# Patient Record
Sex: FEMALE | Race: WHITE | NOT HISPANIC OR LATINO | Employment: FULL TIME | URBAN - METROPOLITAN AREA
[De-identification: names, ages, dates, MRNs, and addresses within clinical notes are randomized per-mention and may not be internally consistent; named-entity substitution may affect disease eponyms.]

---

## 2017-03-21 ENCOUNTER — ALLSCRIPTS OFFICE VISIT (OUTPATIENT)
Dept: OTHER | Facility: OTHER | Age: 57
End: 2017-03-21

## 2017-03-24 ENCOUNTER — ALLSCRIPTS OFFICE VISIT (OUTPATIENT)
Dept: RADIOLOGY | Facility: CLINIC | Age: 57
End: 2017-03-24
Payer: COMMERCIAL

## 2017-03-24 PROCEDURE — 77003 FLUOROGUIDE FOR SPINE INJECT: CPT | Performed by: ANESTHESIOLOGY

## 2017-04-10 ENCOUNTER — ALLSCRIPTS OFFICE VISIT (OUTPATIENT)
Dept: RADIOLOGY | Facility: CLINIC | Age: 57
End: 2017-04-10
Payer: COMMERCIAL

## 2017-04-10 PROCEDURE — 77003 FLUOROGUIDE FOR SPINE INJECT: CPT | Performed by: ANESTHESIOLOGY

## 2017-04-26 ENCOUNTER — GENERIC CONVERSION - ENCOUNTER (OUTPATIENT)
Dept: OTHER | Facility: OTHER | Age: 57
End: 2017-04-26

## 2017-06-30 ENCOUNTER — ALLSCRIPTS OFFICE VISIT (OUTPATIENT)
Dept: OTHER | Facility: OTHER | Age: 57
End: 2017-06-30

## 2017-06-30 ENCOUNTER — APPOINTMENT (OUTPATIENT)
Dept: RADIOLOGY | Facility: CLINIC | Age: 57
End: 2017-06-30
Payer: COMMERCIAL

## 2017-06-30 DIAGNOSIS — M43.10 SPONDYLOLISTHESIS: ICD-10-CM

## 2017-06-30 PROCEDURE — 72020 X-RAY EXAM OF SPINE 1 VIEW: CPT

## 2017-07-05 ENCOUNTER — GENERIC CONVERSION - ENCOUNTER (OUTPATIENT)
Dept: OTHER | Facility: OTHER | Age: 57
End: 2017-07-05

## 2017-07-07 ENCOUNTER — ALLSCRIPTS OFFICE VISIT (OUTPATIENT)
Dept: RADIOLOGY | Facility: CLINIC | Age: 57
End: 2017-07-07
Payer: COMMERCIAL

## 2017-07-14 ENCOUNTER — GENERIC CONVERSION - ENCOUNTER (OUTPATIENT)
Dept: OTHER | Facility: OTHER | Age: 57
End: 2017-07-14

## 2017-07-24 ENCOUNTER — ALLSCRIPTS OFFICE VISIT (OUTPATIENT)
Dept: RADIOLOGY | Facility: CLINIC | Age: 57
End: 2017-07-24
Payer: COMMERCIAL

## 2018-01-13 VITALS
BODY MASS INDEX: 43.4 KG/M2 | TEMPERATURE: 98.2 F | DIASTOLIC BLOOD PRESSURE: 64 MMHG | HEIGHT: 69 IN | WEIGHT: 293 LBS | SYSTOLIC BLOOD PRESSURE: 122 MMHG | HEART RATE: 80 BPM

## 2018-01-15 VITALS
BODY MASS INDEX: 43.4 KG/M2 | HEIGHT: 69 IN | DIASTOLIC BLOOD PRESSURE: 80 MMHG | SYSTOLIC BLOOD PRESSURE: 140 MMHG | HEART RATE: 66 BPM | WEIGHT: 293 LBS

## 2018-01-15 NOTE — RESULT NOTES
Message   Recorded as Task   Date: 07/03/2017 12:08 PM, Created By: [id number]   Task Name: Miscellaneous   Assigned To: Cristina Coyle   Regarding Patient: Mirna Ly, Status: Active   Comment:    Cristina Coyle - 03 Jul 2017 12:08 PM     TASK CREATED  l/m for pt to rtc to [facility]   (LESI X2)   Cristina Coyle - 05 Jul 2017 11:54 AM     TASK EDITED  pt rtc and procedure scheduled    1st on 7/10/2017, the #2 on 07/24/2017  pt aware need for , npo 1 hr prior, hold Naproxen 4 days pt states she does not have anymore left, if becomes ill/antbx call to r/s, wear comfortable pants, pt verbally understanding

## 2018-01-16 NOTE — RESULT NOTES
Message   Recorded as Task   Date: 04/17/2017 04:03 PM, Created By: Monica Hale   Task Name: Follow Up   Assigned To: [facility] [place] procedure,Team   Regarding Patient: Shawn Simeon, Status: Active   Comment:    Monica Hale - 17 Apr 2017 4:03 PM     TASK CREATED  S/p [place] #2 on 4/10/17 w/[place]  No f/u scheduled    Please call 4/17/17   Monica Hale - 19 Apr 2017 2:53 PM     TASK EDITED  1st attempt-lm for f/u after injection   Monica Hale - 21 Apr 2017 9:53 AM     TASK EDITED  2nd attempt-lm for f/u after injection   Oscar Ramírez - 21 Apr 2017 1:25 PM     TASK EDITED  Please send [address] letter  Lucrecia Messina - 24 Apr 2017 1:47 PM     TASK EDITED  Pt returned call following procedure  Pls call pt on cell # [phone]  Kendall,Rochelle - 25 Apr 2017 10:12 AM     TASK REASSIGNED: Previously Assigned To Kathryn De Anda Thomas - 25 Apr 2017 2:23 PM     TASK EDITED  Pt reports 90% relief post inj    F/U is PRN     Sukhi Avitia - 25 Apr 2017 6:07 PM     TASK REPLIED TO: Previously Assigned To Sukhi Avitia  thanks        Signatures   Electronically signed by : Dilan Rosa, ; Apr 26 2017  9:02AM EST                       (Author)

## 2019-10-01 ENCOUNTER — OFFICE VISIT (OUTPATIENT)
Dept: PAIN MEDICINE | Facility: CLINIC | Age: 59
End: 2019-10-01
Payer: COMMERCIAL

## 2019-10-01 VITALS
HEART RATE: 62 BPM | WEIGHT: 293 LBS | DIASTOLIC BLOOD PRESSURE: 82 MMHG | BODY MASS INDEX: 43.4 KG/M2 | HEIGHT: 69 IN | SYSTOLIC BLOOD PRESSURE: 140 MMHG

## 2019-10-01 DIAGNOSIS — M54.16 LUMBAR RADICULOPATHY: ICD-10-CM

## 2019-10-01 DIAGNOSIS — M48.062 SPINAL STENOSIS OF LUMBAR REGION WITH NEUROGENIC CLAUDICATION: Primary | ICD-10-CM

## 2019-10-01 PROBLEM — M54.2 NECK PAIN: Status: ACTIVE | Noted: 2017-03-21

## 2019-10-01 PROBLEM — M51.17 INTERVERTEBRAL DISC DISORDER WITH RADICULOPATHY OF LUMBOSACRAL REGION: Status: ACTIVE | Noted: 2017-07-05

## 2019-10-01 PROBLEM — M43.10 SPONDYLOLISTHESIS, ACQUIRED: Status: ACTIVE | Noted: 2017-06-30

## 2019-10-01 PROBLEM — M54.50 LOWER BACK PAIN: Status: ACTIVE | Noted: 2017-06-30

## 2019-10-01 PROBLEM — I10 HYPERTENSION: Status: ACTIVE | Noted: 2017-03-21

## 2019-10-01 PROBLEM — J45.909 ASTHMA: Status: ACTIVE | Noted: 2017-03-21

## 2019-10-01 PROBLEM — M48.061 LUMBAR SPINAL STENOSIS: Status: ACTIVE | Noted: 2017-06-30

## 2019-10-01 PROBLEM — E66.01 MORBID OBESITY DUE TO EXCESS CALORIES (HCC): Status: ACTIVE | Noted: 2017-06-30

## 2019-10-01 PROBLEM — M48.02 FORAMINAL STENOSIS OF CERVICAL REGION: Status: ACTIVE | Noted: 2017-03-21

## 2019-10-01 PROCEDURE — 99214 OFFICE O/P EST MOD 30 MIN: CPT | Performed by: ANESTHESIOLOGY

## 2019-10-01 RX ORDER — CLONAZEPAM 0.12 MG/1
TABLET, ORALLY DISINTEGRATING ORAL
COMMUNITY
Start: 2019-08-28

## 2019-10-01 RX ORDER — RANITIDINE 300 MG/1
TABLET ORAL
COMMUNITY
Start: 2019-07-23 | End: 2020-08-11

## 2019-10-01 RX ORDER — MONTELUKAST SODIUM 4 MG/1
1 TABLET, CHEWABLE ORAL
COMMUNITY

## 2019-10-01 RX ORDER — ALBUTEROL SULFATE 90 UG/1
1 AEROSOL, METERED RESPIRATORY (INHALATION) AS NEEDED
COMMUNITY
Start: 2019-07-25

## 2019-10-01 RX ORDER — GABAPENTIN 600 MG/1
TABLET ORAL
COMMUNITY
Start: 2019-08-05 | End: 2020-01-29 | Stop reason: SDUPTHER

## 2019-10-01 RX ORDER — DULOXETIN HYDROCHLORIDE 30 MG/1
30 CAPSULE, DELAYED RELEASE ORAL 2 TIMES DAILY
COMMUNITY
Start: 2019-08-05

## 2019-10-01 RX ORDER — MOMETASONE FUROATE 50 UG/1
1 SPRAY, METERED NASAL DAILY
COMMUNITY
Start: 2019-09-21

## 2019-10-01 NOTE — PROGRESS NOTES
Assessment  1  Spinal stenosis of lumbar region with neurogenic claudication    2  Lumbar radiculopathy        Plan  The patient's pain persists despite time, relative rest, activity modification and therapy  I believe that Gomez Ngo would benefit from a lumbar epidural steroid injection to diminish any inflammatory component of her pain  I will initially use a transforaminal approach to better concentrate the steroid along the affected nerve root  The injection may need to be repeated based on the degree of pain relief following the initial injection  Patient is scheduled for surgery what sounds like vein stripping, she would need to be finished worse her surgery and clear for any signs of infection and finished her antibiotics prior to scheduling the the transforaminal epidural steroid injection    In the office today, we reviewed the nature of the patient's pathology in depth using  diagrams and models  I discussed the approach I would use for the epidural steroid injection and provided literature for home review  The patient understands the risks associated with the procedure including but not limited to bleeding, infection, tissue injury, exacerbation of symptoms, allergic reaction, spinal headache, and paralysis and provided verbal consent  My impressions and treatment recommendations were discussed in detail with the patient who verbalized understanding and had no further questions  Discharge instructions were provided  I personally saw and examined the patient and I agree with the above discussed plan of care  This note is created using dictation transcription  It may contain typographical errors, grammatical errors, improperly dictated words, background noise and other errors      Orders Placed This Encounter   Procedures    FL spine and pain procedure     Standing Status:   Future     Standing Expiration Date:   10/1/2023     Order Specific Question:   Reason for Exam:     Answer:   Right L5/S1 TFESI 1     Order Specific Question:   Is the patient pregnant? Answer:   Unknown     Order Specific Question:   Anticoagulant hold needed? Answer:   no    FL spine and pain procedure     Standing Status:   Future     Standing Expiration Date:   10/1/2023     Order Specific Question:   Reason for Exam:     Answer:   Right L5/S1 TFESI 2  Order Specific Question:   Is the patient pregnant? Answer:   Unknown     Order Specific Question:   Anticoagulant hold needed? Answer:   no     New Medications Ordered This Visit   Medications    albuterol (PROVENTIL HFA,VENTOLIN HFA) 90 mcg/act inhaler     Substitution to a formulary equivalent within the same pharmaceutical class is authorized   clonazePAM (KlonoPIN) 0 5 mg tablet    colestipol (COLESTID) 1 g tablet     Sig: Take by mouth    DULoxetine (CYMBALTA) 30 mg delayed release capsule    ADVAIR DISKUS 250-50 MCG/DOSE inhaler     Substitution to a formulary equivalent within the same pharmaceutical class is authorized   gabapentin (NEURONTIN) 600 MG tablet    mometasone (NASONEX) 50 mcg/act nasal spray    ranitidine (ZANTAC) 300 MG tablet       History of Present Illness    Wild Khan is a 63 y o  female who I saw in 2017 underwent epidural steroid injection for lumbar radicular symptoms with significant relief  She states the past six weeks her pain is returned which she describes as 8/10 on the visual analog scale is intermittent described sharp throbbing cramping and shooting  But pain starts the right side of her low back radiating down the posterior lateral aspect of her right leg standing and walking increases symptoms rest relaxation reduce his them  He denies any bowel or bladder difficulty  I have personally reviewed and/or updated the patient's past medical history, past surgical history, family history, social history, current medications, allergies, and vital signs today       Review of Systems   Constitutional: Negative for fever and unexpected weight change  HENT: Negative for trouble swallowing  Eyes: Negative for visual disturbance  Respiratory: Negative for shortness of breath and wheezing  Cardiovascular: Negative for chest pain and palpitations  Gastrointestinal: Negative for constipation, diarrhea, nausea and vomiting  Endocrine: Negative for cold intolerance, heat intolerance and polydipsia  Genitourinary: Negative for difficulty urinating and frequency  Musculoskeletal: Negative for arthralgias, gait problem, joint swelling and myalgias  Skin: Negative for rash  Neurological: Negative for dizziness, seizures, syncope, weakness and headaches  Hematological: Does not bruise/bleed easily  Psychiatric/Behavioral: Negative for dysphoric mood  All other systems reviewed and are negative  Patient Active Problem List   Diagnosis    Asthma    Foraminal stenosis of cervical region    Hypertension    Intervertebral disc disorder with radiculopathy of lumbosacral region    Lower back pain    Lumbar spinal stenosis    Morbid obesity due to excess calories (HCC)    Neck pain    Spondylolisthesis, acquired       History reviewed  No pertinent past medical history  History reviewed  No pertinent surgical history  History reviewed  No pertinent family history      Social History     Occupational History    Not on file   Tobacco Use    Smoking status: Former Smoker    Smokeless tobacco: Never Used   Substance and Sexual Activity    Alcohol use: Yes     Frequency: Monthly or less     Drinks per session: 1 or 2     Binge frequency: Never    Drug use: Never    Sexual activity: Not Currently       Current Outpatient Medications on File Prior to Visit   Medication Sig    ADVAIR DISKUS 250-50 MCG/DOSE inhaler     albuterol (PROVENTIL HFA,VENTOLIN HFA) 90 mcg/act inhaler     clonazePAM (KlonoPIN) 0 5 mg tablet     colestipol (COLESTID) 1 g tablet Take by mouth    DULoxetine (CYMBALTA) 30 mg delayed release capsule     gabapentin (NEURONTIN) 600 MG tablet     mometasone (NASONEX) 50 mcg/act nasal spray     ranitidine (ZANTAC) 300 MG tablet      No current facility-administered medications on file prior to visit  Allergies   Allergen Reactions    Lisinopril        Physical Exam    /82   Pulse 62   Ht 5' 9" (1 753 m)   Wt (!) 156 kg (343 lb)   BMI 50 65 kg/m²     Constitutional: normal, well developed, well nourished, alert, in no distress and non-toxic and no overt pain behavior   and obese  Eyes: anicteric  HEENT: grossly intact  Neck: supple, symmetric, trachea midline and no masses   Pulmonary:even and unlabored  Cardiovascular:No edema or pitting edema present  Skin:Normal without rashes or lesions and well hydrated  Psychiatric:Mood and affect appropriate  Neurologic:Cranial Nerves II-XII grossly intact  Musculoskeletal:normal, difficulty going from sitting to standing to sitting position, no obvious skin lesions or erythema lumbar sacral spine no tenderness to palpation lumbar sacral spine spinous process sacroiliac joint or greater trochanter bilateral, deep tendon reflexes are diminished but symmetrical patella and Achilles, no focal motor deficit appreciated in the lower extremities positive straight leg raising on the right negative on the    Imaging  MRI lumbar spine performed May 23, [email or web address]    L5-S1 grade 3 anterolisthesis with severe bilateral facet hypertrophy causing severe central stenosis, L4-5 severe bilateral facet hypertrophy with mild central stenosis and mild bilateral foraminal narrowing,

## 2019-10-03 ENCOUNTER — TELEPHONE (OUTPATIENT)
Dept: RADIOLOGY | Facility: CLINIC | Age: 59
End: 2019-10-03

## 2019-10-03 NOTE — TELEPHONE ENCOUNTER
FYI:    Pt will be stopping by the office on 10/10/19 around 1 pm to have her leg looked at per SL request  If clear pt is already scheduled for back inj on 10/18/2019

## 2019-10-03 NOTE — TELEPHONE ENCOUNTER
Pt called stated she had her varicose vein surgery today and wanted to schedule procedures recommended TFMANNY  Pt states the made an incision in her leg the heated up a wire to burn the vein and then pulled the vein out from the incision she states no sutures just glued and wrapped  How long to wait to schedule inj from her surgery

## 2019-10-03 NOTE — TELEPHONE ENCOUNTER
Can you have her see scheduled in 1/2 weeks but have her stop by the office for nurse to look at it in one week

## 2019-10-10 NOTE — TELEPHONE ENCOUNTER
Pt presented in the office for evaluation of procedure wounds  Several incisions, some covered w/ steri strips, some exposed  None w/ s/s infection  Advised pt, clear for procedure  Pt stated that her surgeon advised of the same  Reviewed pre procedure instructions; eat a light meal - npo 1 hour prior, , loose fitting clothing, cb if illness / abx start prior to procedure, pt verbalized understanding, stated that she has been holding NSAIDS x "weeks", denied rx blood thinning medications  Advised pt to cb if questions or concerns arise       SL aware

## 2019-10-10 NOTE — TELEPHONE ENCOUNTER
S/w pt, requested that she come in before 1:00 or after 2:30 s/t complex procedures scheduled at that time  Pt verbalized understanding   Stated that she will arrive between 15 and 12:30

## 2019-10-18 ENCOUNTER — HOSPITAL ENCOUNTER (OUTPATIENT)
Dept: RADIOLOGY | Facility: CLINIC | Age: 59
Discharge: HOME | End: 2019-10-18
Admitting: ANESTHESIOLOGY
Payer: COMMERCIAL

## 2019-10-18 VITALS
TEMPERATURE: 98.3 F | HEART RATE: 78 BPM | SYSTOLIC BLOOD PRESSURE: 144 MMHG | RESPIRATION RATE: 20 BRPM | OXYGEN SATURATION: 96 % | DIASTOLIC BLOOD PRESSURE: 86 MMHG

## 2019-10-18 DIAGNOSIS — M48.062 SPINAL STENOSIS OF LUMBAR REGION WITH NEUROGENIC CLAUDICATION: ICD-10-CM

## 2019-10-18 DIAGNOSIS — M54.16 LUMBAR RADICULOPATHY: ICD-10-CM

## 2019-10-18 PROCEDURE — 64483 NJX AA&/STRD TFRM EPI L/S 1: CPT | Performed by: ANESTHESIOLOGY

## 2019-10-18 PROCEDURE — 64484 NJX AA&/STRD TFRM EPI L/S EA: CPT | Performed by: ANESTHESIOLOGY

## 2019-10-18 RX ORDER — LIDOCAINE HYDROCHLORIDE 10 MG/ML
5 INJECTION, SOLUTION EPIDURAL; INFILTRATION; INTRACAUDAL; PERINEURAL ONCE
Status: COMPLETED | OUTPATIENT
Start: 2019-10-18 | End: 2019-10-18

## 2019-10-18 RX ORDER — METHYLPREDNISOLONE ACETATE 80 MG/ML
160 INJECTION, SUSPENSION INTRA-ARTICULAR; INTRALESIONAL; INTRAMUSCULAR; PARENTERAL; SOFT TISSUE ONCE
Status: COMPLETED | OUTPATIENT
Start: 2019-10-18 | End: 2019-10-18

## 2019-10-18 RX ADMIN — LIDOCAINE HYDROCHLORIDE 2 ML: 20 INJECTION, SOLUTION EPIDURAL; INFILTRATION; INTRACAUDAL at 11:30

## 2019-10-18 RX ADMIN — METHYLPREDNISOLONE ACETATE 160 MG: 80 INJECTION, SUSPENSION INTRA-ARTICULAR; INTRALESIONAL; INTRAMUSCULAR; SOFT TISSUE at 11:30

## 2019-10-18 RX ADMIN — IOHEXOL 1 ML: 300 INJECTION, SOLUTION INTRAVENOUS at 11:24

## 2019-10-18 RX ADMIN — LIDOCAINE HYDROCHLORIDE 3 ML: 10 INJECTION, SOLUTION EPIDURAL; INFILTRATION; INTRACAUDAL; PERINEURAL at 11:23

## 2019-10-18 NOTE — H&P
History of Present Illness: The patient is a 63 y o  female who presents with complaints of low back and leg pain  Patient Active Problem List   Diagnosis    Asthma    Foraminal stenosis of cervical region    Hypertension    Intervertebral disc disorder with radiculopathy of lumbosacral region    Lower back pain    Lumbar spinal stenosis    Morbid obesity due to excess calories (HCC)    Neck pain    Spondylolisthesis, acquired       No past medical history on file  No past surgical history on file  Current Outpatient Medications:     ADVAIR DISKUS 250-50 MCG/DOSE inhaler, , Disp: , Rfl:     albuterol (PROVENTIL HFA,VENTOLIN HFA) 90 mcg/act inhaler, , Disp: , Rfl:     clonazePAM (KlonoPIN) 0 5 mg tablet, , Disp: , Rfl:     colestipol (COLESTID) 1 g tablet, Take by mouth, Disp: , Rfl:     DULoxetine (CYMBALTA) 30 mg delayed release capsule, , Disp: , Rfl:     gabapentin (NEURONTIN) 600 MG tablet, , Disp: , Rfl:     mometasone (NASONEX) 50 mcg/act nasal spray, , Disp: , Rfl:     ranitidine (ZANTAC) 300 MG tablet, , Disp: , Rfl:     Current Facility-Administered Medications:     iohexol (OMNIPAQUE) 300 mg/mL injection 50 mL, 50 mL, Epidural, Once, Sukhi Avitia DO    lidocaine (PF) (XYLOCAINE-MPF) 1 % injection 5 mL, 5 mL, Other, Once, Sukhi Avitia DO    lidocaine (PF) (XYLOCAINE-MPF) 2 % injection 5 mL, 5 mL, Epidural, Once, Sukhi Avitia DO    methylPREDNISolone acetate (DEPO-MEDROL) injection 160 mg, 160 mg, Epidural, Once, Sukhi Avitia DO    Allergies   Allergen Reactions    Lisinopril        Physical Exam:   General: Awake, Alert, Oriented x 3, Mood and affect appropriate  Respiratory: Respirations even and unlabored  Cardiovascular: Peripheral pulses intact; no edema  Musculoskeletal Exam:  Decreased range of motion lumbar spine    ASA Score: II         Assessment:   1  Spinal stenosis of lumbar region with neurogenic claudication    2   Lumbar radiculopathy        Plan: Right L5/S1 TFESI 1

## 2019-10-18 NOTE — DISCHARGE INSTR - LAB
Epidural Steroid Injection   WHAT YOU NEED TO KNOW:   An epidural steroid injection (MANNY) is a procedure to inject steroid medicine into the epidural space  The epidural space is between your spinal cord and vertebrae  Steroids reduce inflammation and fluid buildup in your spine that may be causing pain  You may be given pain medicine along with the steroids  ACTIVITY  · Do not drive or operate machinery today  · No strenuous activity today - bending, lifting, etc   · You may resume normal activites starting tomorrow - start slowly and as tolerated  · You may shower today, but no tub baths or hot tubs  · You may have numbness for several hours from the local anesthetic  Please use caution and common sense, especially with weight-bearing activities  CARE OF THE INJECTION SITE  · If you have soreness or pain, apply ice to the area today (20 minutes on/20 minutes off)  · Starting tomorrow, you may use warm, moist heat or ice if needed  · You may have an increase or change in your discomfort for 36-48 hours after your treatment  · Apply ice and continue with any pain medication you have been prescribed  · Notify [facility] if you have any of the following: redness, drainage, swelling, headache, stiff neck or fever above 100°F     SPECIAL INSTRUCTIONS  · Our office will contact you in approximately 7 days for a progress report  MEDICATIONS  · Continue to take all routine medications  · Our office may have instructed you to hold some medications  If you have a problem specifically related to your procedure, please call our office at [phone]  Problems not related to your procedure should be directed to your primary care physician

## 2019-10-25 ENCOUNTER — TELEPHONE (OUTPATIENT)
Dept: PAIN MEDICINE | Facility: CLINIC | Age: 59
End: 2019-10-25

## 2019-10-25 NOTE — TELEPHONE ENCOUNTER
Patient said that she is doing ok  Her pain level is a 3/10  The injection helped about 35%   Patient doesn't need a Fu call she has an appt on 11/8/19

## 2019-10-25 NOTE — TELEPHONE ENCOUNTER
1st attempt  Lm to cb with % improvement and pain level         S/p Rt L5/S1 TFESI #1 10/18, 11/8 TFESI #2

## 2019-11-08 ENCOUNTER — HOSPITAL ENCOUNTER (OUTPATIENT)
Dept: RADIOLOGY | Facility: CLINIC | Age: 59
Discharge: HOME | End: 2019-11-08
Admitting: ANESTHESIOLOGY
Payer: COMMERCIAL

## 2019-11-08 VITALS
RESPIRATION RATE: 20 BRPM | SYSTOLIC BLOOD PRESSURE: 124 MMHG | TEMPERATURE: 97.8 F | OXYGEN SATURATION: 93 % | DIASTOLIC BLOOD PRESSURE: 76 MMHG | HEART RATE: 85 BPM

## 2019-11-08 DIAGNOSIS — M54.16 LUMBAR RADICULOPATHY: ICD-10-CM

## 2019-11-08 DIAGNOSIS — M48.062 SPINAL STENOSIS OF LUMBAR REGION WITH NEUROGENIC CLAUDICATION: ICD-10-CM

## 2019-11-08 PROCEDURE — 64484 NJX AA&/STRD TFRM EPI L/S EA: CPT | Performed by: ANESTHESIOLOGY

## 2019-11-08 PROCEDURE — 64483 NJX AA&/STRD TFRM EPI L/S 1: CPT | Performed by: ANESTHESIOLOGY

## 2019-11-08 RX ORDER — AZELASTINE 1 MG/ML
1 SPRAY, METERED NASAL DAILY
Refills: 11 | COMMUNITY
Start: 2019-11-04

## 2019-11-08 RX ORDER — METHYLPREDNISOLONE ACETATE 80 MG/ML
160 INJECTION, SUSPENSION INTRA-ARTICULAR; INTRALESIONAL; INTRAMUSCULAR; PARENTERAL; SOFT TISSUE ONCE
Status: COMPLETED | OUTPATIENT
Start: 2019-11-08 | End: 2019-11-08

## 2019-11-08 RX ORDER — LOSARTAN POTASSIUM 50 MG/1
50 TABLET ORAL DAILY
Refills: 1 | COMMUNITY
Start: 2019-09-23 | End: 2020-10-15

## 2019-11-08 RX ORDER — HYDROCHLOROTHIAZIDE 12.5 MG/1
12.5 CAPSULE, GELATIN COATED ORAL DAILY
Refills: 1 | COMMUNITY
Start: 2019-09-23 | End: 2020-10-15

## 2019-11-08 RX ORDER — LIDOCAINE HYDROCHLORIDE 10 MG/ML
5 INJECTION, SOLUTION EPIDURAL; INFILTRATION; INTRACAUDAL; PERINEURAL ONCE
Status: COMPLETED | OUTPATIENT
Start: 2019-11-08 | End: 2019-11-08

## 2019-11-08 RX ADMIN — METHYLPREDNISOLONE ACETATE 160 MG: 80 INJECTION, SUSPENSION INTRA-ARTICULAR; INTRALESIONAL; INTRAMUSCULAR; SOFT TISSUE at 09:59

## 2019-11-08 RX ADMIN — LIDOCAINE HYDROCHLORIDE 3 ML: 10 INJECTION, SOLUTION EPIDURAL; INFILTRATION; INTRACAUDAL; PERINEURAL at 09:56

## 2019-11-08 RX ADMIN — LIDOCAINE HYDROCHLORIDE 3 ML: 20 INJECTION, SOLUTION EPIDURAL; INFILTRATION; INTRACAUDAL at 09:59

## 2019-11-08 RX ADMIN — IOHEXOL 1 ML: 300 INJECTION, SOLUTION INTRAVENOUS at 09:59

## 2019-11-08 NOTE — DISCHARGE INSTRUCTIONS
Epidural Steroid Injection   WHAT YOU NEED TO KNOW:   An epidural steroid injection (MANNY) is a procedure to inject steroid medicine into the epidural space  The epidural space is between your spinal cord and vertebrae  Steroids reduce inflammation and fluid buildup in your spine that may be causing pain  You may be given pain medicine along with the steroids  ACTIVITY  · Do not drive or operate machinery today  · No strenuous activity today - bending, lifting, etc   · You may resume normal activites starting tomorrow - start slowly and as tolerated  · You may shower today, but no tub baths or hot tubs  · You may have numbness for several hours from the local anesthetic  Please use caution and common sense, especially with weight-bearing activities  CARE OF THE INJECTION SITE  · If you have soreness or pain, apply ice to the area today (20 minutes on/20 minutes off)  · Starting tomorrow, you may use warm, moist heat or ice if needed  · You may have an increase or change in your discomfort for 36-48 hours after your treatment  · Apply ice and continue with any pain medication you have been prescribed  · Notify the [facility] if you have any of the following: redness, drainage, swelling, headache, stiff neck or fever above 100°F     SPECIAL INSTRUCTIONS  · Our office will contact you in approximately 7 days for a progress report  MEDICATIONS  · Continue to take all routine medications  · Our office may have instructed you to hold some medications  If you have a problem specifically related to your procedure, please call our office at [phone]  Problems not related to your procedure should be directed to your primary care physician

## 2019-11-08 NOTE — H&P
History of Present Illness: The patient is a 63 y o  female who presents with complaints of low back and leg pain  Patient Active Problem List   Diagnosis    Asthma    Foraminal stenosis of cervical region    Hypertension    Intervertebral disc disorder with radiculopathy of lumbosacral region    Lower back pain    Lumbar spinal stenosis    Morbid obesity due to excess calories (HCC)    Neck pain    Spondylolisthesis, acquired    Lumbar radiculopathy       No past medical history on file  No past surgical history on file        Current Outpatient Medications:     ADVAIR DISKUS 250-50 MCG/DOSE inhaler, , Disp: , Rfl:     albuterol (PROVENTIL HFA,VENTOLIN HFA) 90 mcg/act inhaler, , Disp: , Rfl:     azelastine (ASTELIN) 0 1 % nasal spray, INHALE 1 SPRAY IN EACH NOSTRIL TWO TIMES A DAY, Disp: , Rfl: 11    clonazePAM (KlonoPIN) 0 5 mg tablet, , Disp: , Rfl:     colestipol (COLESTID) 1 g tablet, Take by mouth, Disp: , Rfl:     DULoxetine (CYMBALTA) 30 mg delayed release capsule, , Disp: , Rfl:     gabapentin (NEURONTIN) 600 MG tablet, , Disp: , Rfl:     hydrochlorothiazide (MICROZIDE) 12 5 mg capsule, Take 12 5 mg by mouth daily, Disp: , Rfl: 1    losartan (COZAAR) 50 mg tablet, Take 50 mg by mouth daily, Disp: , Rfl: 1    mometasone (NASONEX) 50 mcg/act nasal spray, , Disp: , Rfl:     ranitidine (ZANTAC) 300 MG tablet, , Disp: , Rfl:     Current Facility-Administered Medications:     iohexol (OMNIPAQUE) 300 mg/mL injection 50 mL, 50 mL, Epidural, Once, Sukhi Avitia DO    lidocaine (PF) (XYLOCAINE-MPF) 1 % injection 5 mL, 5 mL, Other, Once, Sukhi Avitia DO    lidocaine (PF) (XYLOCAINE-MPF) 2 % injection 5 mL, 5 mL, Epidural, Once, Sukhi Avitia DO    methylPREDNISolone acetate (DEPO-MEDROL) injection 160 mg, 160 mg, Epidural, Once, Sukhi Avitia DO    Allergies   Allergen Reactions    Lisinopril        Physical Exam:   Vitals:    11/08/19 0946   BP: 115/72   Pulse: 81   Resp: 20   Temp: 97 8 °F (36 6 °C)   SpO2: 92%     General: Awake, Alert, Oriented x 3, Mood and affect appropriate  Respiratory: Respirations even and unlabored  Cardiovascular: Peripheral pulses intact; no edema  Musculoskeletal Exam:  Decreased range of motion lumbar spine    ASA Score: III    Patient/Chart Verification  Patient ID Verified: Verbal  ID Band Applied: No  Consents Confirmed: Procedural  H&P( within 30 days) Verified: To be obtained in the Pre-Procedure area  Interval H&P(within 24 hr) Complete (required for Outpatients and Surgery Admit only): To be obtained in the Pre-Procedure area  Allergies Reviewed: Yes  Anticoag/NSAID held?: NA  Currently on antibiotics?: No    Assessment:   1  Spinal stenosis of lumbar region with neurogenic claudication    2  Lumbar radiculopathy        Plan: Right L5/S1 TFESI 2

## 2019-11-15 ENCOUNTER — TELEPHONE (OUTPATIENT)
Dept: PAIN MEDICINE | Facility: CLINIC | Age: 59
End: 2019-11-15

## 2019-11-22 ENCOUNTER — OFFICE VISIT (OUTPATIENT)
Dept: PAIN MEDICINE | Facility: CLINIC | Age: 59
End: 2019-11-22
Payer: COMMERCIAL

## 2019-11-22 VITALS
DIASTOLIC BLOOD PRESSURE: 66 MMHG | WEIGHT: 293 LBS | BODY MASS INDEX: 43.4 KG/M2 | HEART RATE: 86 BPM | HEIGHT: 69 IN | SYSTOLIC BLOOD PRESSURE: 128 MMHG

## 2019-11-22 DIAGNOSIS — M54.50 CHRONIC RIGHT-SIDED LOW BACK PAIN WITHOUT SCIATICA: Primary | ICD-10-CM

## 2019-11-22 DIAGNOSIS — M51.17 INTERVERTEBRAL DISC DISORDER WITH RADICULOPATHY OF LUMBOSACRAL REGION: ICD-10-CM

## 2019-11-22 DIAGNOSIS — M54.16 LUMBAR RADICULOPATHY: ICD-10-CM

## 2019-11-22 DIAGNOSIS — G89.29 CHRONIC RIGHT-SIDED LOW BACK PAIN WITHOUT SCIATICA: Primary | ICD-10-CM

## 2019-11-22 DIAGNOSIS — M48.062 SPINAL STENOSIS OF LUMBAR REGION WITH NEUROGENIC CLAUDICATION: ICD-10-CM

## 2019-11-22 DIAGNOSIS — M43.10 SPONDYLOLISTHESIS, ACQUIRED: ICD-10-CM

## 2019-11-22 PROCEDURE — 99213 OFFICE O/P EST LOW 20 MIN: CPT | Performed by: NURSE PRACTITIONER

## 2019-11-22 NOTE — PROGRESS NOTES
Assessment:  1  Chronic right-sided low back pain without sciatica    2  Lumbar radiculopathy    3  Intervertebral disc disorder with radiculopathy of lumbosacral region    4  Spondylolisthesis, acquired    5  Spinal stenosis of lumbar region with neurogenic claudication        Plan:  I discussed with the patient that since there has been moderate-significant improvement in the pain symptoms that we will hold off on any repeat injections at this point in time  However, I did explain that if over the next 8-12 weeks the pain symptoms should return, worsen, and/or change, we could consider a repeat injection  If after the 12 weeks an OV would be warranted for re-evaluation  The patient was agreeable and verbalized an understanding  I advised the patient that she can continue to use the naproxen as needed and not to take more than 1500 mg a day and to make sure she is taking it with food  The patient was also instructed not to take any other oral NSAIDs while she is taking naproxen except for acetaminophen or Tylenol  The patient was agreeable and verbalized an understanding  I discussed with the patient that at this point time she can followup with our office on an as-needed basis  I did review the patient that if her pain symptoms should change, worsen, and/or if she would experience any new symptoms as she would like to be evaluated for, she should give our office a call  The patient was agreeable and verbalized an understanding  History of Present Illness: The patient is a 63 y o  female last seen on 11/08/19 who presents for a follow up office visit in regards to chronic pain syndrome secondary to lumbar spondylolisthesis, stenosis, and radiculopathy    The patient currently reports that since her last office visit she does feel there has been moderate to significant overall improvement as she is status post her 2nd right L5 and S1 transforaminal epidural steroid injection on November 8, 2019 with Dr Thien Saha  The patient reports that overall she is noting at least 70% improvement in her right-sided low back and leg pain and that she also continues with chiropractic adjustments and massage therapy which she feels is helpful as well  She reports that since the injection she is not taking copious amounts of naproxen and is just using it as needed on days when her pain is worse, keeping her pain manageable  I have personally reviewed and/or updated the patient's past medical history, past surgical history, family history, social history, current medications, allergies, and vital signs today  Review of Systems:    Review of Systems   Respiratory: Negative for shortness of breath  Cardiovascular: Negative for chest pain  Gastrointestinal: Negative for constipation, diarrhea, nausea and vomiting  Musculoskeletal: Positive for gait problem (difficulty walking )  Negative for arthralgias, joint swelling and myalgias  Skin: Negative for rash  Neurological: Negative for dizziness, seizures and weakness  All other systems reviewed and are negative  History reviewed  No pertinent past medical history  History reviewed  No pertinent surgical history  History reviewed  No pertinent family history      Social History     Occupational History    Not on file   Tobacco Use    Smoking status: Former Smoker    Smokeless tobacco: Never Used   Substance and Sexual Activity    Alcohol use: Yes     Frequency: Monthly or less     Drinks per session: 1 or 2     Binge frequency: Never    Drug use: Never    Sexual activity: Not Currently         Current Outpatient Medications:     ADVAIR DISKUS 250-50 MCG/DOSE inhaler, , Disp: , Rfl:     albuterol (PROVENTIL HFA,VENTOLIN HFA) 90 mcg/act inhaler, , Disp: , Rfl:     azelastine (ASTELIN) 0 1 % nasal spray, INHALE 1 SPRAY IN EACH NOSTRIL TWO TIMES A DAY, Disp: , Rfl: 11    clonazePAM (KlonoPIN) 0 5 mg tablet, , Disp: , Rfl:    colestipol (COLESTID) 1 g tablet, Take by mouth, Disp: , Rfl:     DULoxetine (CYMBALTA) 30 mg delayed release capsule, , Disp: , Rfl:     gabapentin (NEURONTIN) 600 MG tablet, , Disp: , Rfl:     hydrochlorothiazide (MICROZIDE) 12 5 mg capsule, Take 12 5 mg by mouth daily, Disp: , Rfl: 1    losartan (COZAAR) 50 mg tablet, Take 50 mg by mouth daily, Disp: , Rfl: 1    mometasone (NASONEX) 50 mcg/act nasal spray, , Disp: , Rfl:     ranitidine (ZANTAC) 300 MG tablet, , Disp: , Rfl:     Allergies   Allergen Reactions    Lisinopril        Physical Exam:    /66   Pulse 86   Ht 5' 9" (1 753 m)   Wt (!) 155 kg (342 lb)   BMI 50 50 kg/m²     Constitutional:normal, well developed, well nourished, alert, in no distress and non-toxic and no overt pain behavior  and obese  Eyes:anicteric  HEENT:grossly intact  Neck:supple, symmetric, trachea midline and no masses   Pulmonary:even and unlabored  Cardiovascular:No edema or pitting edema present  Skin:Normal without rashes or lesions and well hydrated  Psychiatric:Mood and affect appropriate  Neurologic:Cranial Nerves II-XII grossly intact  Musculoskeletal:The patient's gait is slightly antalgic, painful, but steady without the use of any assistive devices  Imaging  No orders to display         No orders of the defined types were placed in this encounter

## 2019-11-29 ENCOUNTER — TELEPHONE (OUTPATIENT)
Dept: PAIN MEDICINE | Facility: MEDICAL CENTER | Age: 59
End: 2019-11-29

## 2019-11-29 DIAGNOSIS — M54.50 CHRONIC RIGHT-SIDED LOW BACK PAIN WITHOUT SCIATICA: ICD-10-CM

## 2019-11-29 DIAGNOSIS — M48.062 SPINAL STENOSIS OF LUMBAR REGION WITH NEUROGENIC CLAUDICATION: ICD-10-CM

## 2019-11-29 DIAGNOSIS — G89.29 CHRONIC RIGHT-SIDED LOW BACK PAIN WITHOUT SCIATICA: ICD-10-CM

## 2019-11-29 DIAGNOSIS — M54.16 LUMBAR RADICULOPATHY: Primary | ICD-10-CM

## 2019-11-29 NOTE — TELEPHONE ENCOUNTER
She can use the Naproxen as needed  Lets schedule her for a repeat Right L5 & S1 TFESI with Dr Akash Hargrove  I put in the orders  Thank you

## 2019-11-29 NOTE — TELEPHONE ENCOUNTER
RN s/w pt regarding previous  Pt last saw DG on 11/22 and symptoms were better so planned to hold off on any more injections at present  Per pt the pain has been intermittently since that time, and for the last two days very "flarred up " Pt did state that she has been doing a lot the last two days getting ready for Thanksgiving  Per pt she has taken naproxen as discussed but did not take any today because if she was able to have another injection next week she did not want that to stop her  Per pt she did take 2 tylenol this AM, and did use ice  Pt is out shopping at present but had to find a place to sit due to pain  Pt stated that pain is same as prior , in her Rt butt cheek  and in her right foot, per pt she does not have any pain in her thigh or calf  RN advised to take the naproxen if it helps, that would not stop a possible injection, as the policy and guidelines have recently been updated  Pt aware will send to DG and c/b with suggestions and recs for same      --please advise thank you--

## 2019-11-29 NOTE — TELEPHONE ENCOUNTER
RN s/w pt regarding previous  Pt scheduled for 12/13 at 8:10 arrival time for a Rt L5-S1 TFESI  Pt aware may medicate with naproxen and alternate with tylenol, does not need to hold for procedure  Pt denied prescription strength blood thinners  May take AM meds before procedure, NPO one hour before  Wear loose comfortable elastic waist clothing, no zippers or snaps,call to reschedule if become ill has a fever or starts on antibiotics, must have a   Pt appreciative of all information

## 2019-12-13 ENCOUNTER — HOSPITAL ENCOUNTER (OUTPATIENT)
Dept: RADIOLOGY | Facility: CLINIC | Age: 59
Discharge: HOME | End: 2019-12-13
Attending: ANESTHESIOLOGY | Admitting: ANESTHESIOLOGY
Payer: COMMERCIAL

## 2019-12-13 VITALS
DIASTOLIC BLOOD PRESSURE: 85 MMHG | TEMPERATURE: 97.7 F | RESPIRATION RATE: 18 BRPM | OXYGEN SATURATION: 95 % | SYSTOLIC BLOOD PRESSURE: 123 MMHG | HEART RATE: 77 BPM

## 2019-12-13 DIAGNOSIS — M54.50 CHRONIC RIGHT-SIDED LOW BACK PAIN WITHOUT SCIATICA: ICD-10-CM

## 2019-12-13 DIAGNOSIS — G89.29 CHRONIC RIGHT-SIDED LOW BACK PAIN WITHOUT SCIATICA: ICD-10-CM

## 2019-12-13 DIAGNOSIS — M54.16 LUMBAR RADICULOPATHY: ICD-10-CM

## 2019-12-13 DIAGNOSIS — M48.062 SPINAL STENOSIS OF LUMBAR REGION WITH NEUROGENIC CLAUDICATION: ICD-10-CM

## 2019-12-13 PROCEDURE — 64483 NJX AA&/STRD TFRM EPI L/S 1: CPT | Performed by: ANESTHESIOLOGY

## 2019-12-13 PROCEDURE — 64484 NJX AA&/STRD TFRM EPI L/S EA: CPT | Performed by: ANESTHESIOLOGY

## 2019-12-13 RX ORDER — METHYLPREDNISOLONE ACETATE 80 MG/ML
160 INJECTION, SUSPENSION INTRA-ARTICULAR; INTRALESIONAL; INTRAMUSCULAR; PARENTERAL; SOFT TISSUE ONCE
Status: COMPLETED | OUTPATIENT
Start: 2019-12-13 | End: 2019-12-13

## 2019-12-13 RX ORDER — LIDOCAINE HYDROCHLORIDE 10 MG/ML
5 INJECTION, SOLUTION EPIDURAL; INFILTRATION; INTRACAUDAL; PERINEURAL ONCE
Status: COMPLETED | OUTPATIENT
Start: 2019-12-13 | End: 2019-12-13

## 2019-12-13 RX ADMIN — IOHEXOL 1 ML: 300 INJECTION, SOLUTION INTRAVENOUS at 08:36

## 2019-12-13 RX ADMIN — LIDOCAINE HYDROCHLORIDE 2 ML: 20 INJECTION, SOLUTION EPIDURAL; INFILTRATION; INTRACAUDAL at 08:37

## 2019-12-13 RX ADMIN — LIDOCAINE HYDROCHLORIDE 3 ML: 10 INJECTION, SOLUTION EPIDURAL; INFILTRATION; INTRACAUDAL; PERINEURAL at 08:35

## 2019-12-13 RX ADMIN — METHYLPREDNISOLONE ACETATE 160 MG: 80 INJECTION, SUSPENSION INTRA-ARTICULAR; INTRALESIONAL; INTRAMUSCULAR; SOFT TISSUE at 08:37

## 2019-12-13 NOTE — H&P
History of Present Illness: The patient is a 63 y o  female who presents with complaints of low back and leg pain  Patient Active Problem List   Diagnosis    Asthma    Foraminal stenosis of cervical region    Hypertension    Intervertebral disc disorder with radiculopathy of lumbosacral region    Lower back pain    Lumbar spinal stenosis    Morbid obesity due to excess calories (HCC)    Neck pain    Spondylolisthesis, acquired    Lumbar radiculopathy       No past medical history on file  No past surgical history on file  Current Outpatient Medications:     ADVAIR DISKUS 250-50 MCG/DOSE inhaler, , Disp: , Rfl:     albuterol (PROVENTIL HFA,VENTOLIN HFA) 90 mcg/act inhaler, , Disp: , Rfl:     azelastine (ASTELIN) 0 1 % nasal spray, INHALE 1 SPRAY IN EACH NOSTRIL TWO TIMES A DAY, Disp: , Rfl: 11    clonazePAM (KlonoPIN) 0 5 mg tablet, , Disp: , Rfl:     colestipol (COLESTID) 1 g tablet, Take by mouth, Disp: , Rfl:     DULoxetine (CYMBALTA) 30 mg delayed release capsule, , Disp: , Rfl:     gabapentin (NEURONTIN) 600 MG tablet, , Disp: , Rfl:     hydrochlorothiazide (MICROZIDE) 12 5 mg capsule, Take 12 5 mg by mouth daily, Disp: , Rfl: 1    losartan (COZAAR) 50 mg tablet, Take 50 mg by mouth daily, Disp: , Rfl: 1    mometasone (NASONEX) 50 mcg/act nasal spray, , Disp: , Rfl:     ranitidine (ZANTAC) 300 MG tablet, , Disp: , Rfl:     Allergies   Allergen Reactions    Lisinopril        Physical Exam:   General: Awake, Alert, Oriented x 3, Mood and affect appropriate  Respiratory: Respirations even and unlabored  Cardiovascular: Peripheral pulses intact; no edema  Musculoskeletal Exam:  Decreased range of motion lumbar spine    ASA Score: III         Assessment:   1  Lumbar radiculopathy    2  Chronic right-sided low back pain without sciatica    3   Spinal stenosis of lumbar region with neurogenic claudication        Plan: Right L5 & S1 TFESI

## 2019-12-13 NOTE — DISCHARGE INSTRUCTIONS
Epidural Steroid Injection   WHAT YOU NEED TO KNOW:   An epidural steroid injection (MANNY) is a procedure to inject steroid medicine into the epidural space  The epidural space is between your spinal cord and vertebrae  Steroids reduce inflammation and fluid buildup in your spine that may be causing pain  You may be given pain medicine along with the steroids  ACTIVITY  · Do not drive or operate machinery today  · No strenuous activity today - bending, lifting, etc   · You may resume normal activites starting tomorrow - start slowly and as tolerated  · You may shower today, but no tub baths or hot tubs  · You may have numbness for several hours from the local anesthetic  Please use caution and common sense, especially with weight-bearing activities  CARE OF THE INJECTION SITE  · If you have soreness or pain, apply ice to the area today (20 minutes on/20 minutes off)  · Starting tomorrow, you may use warm, moist heat or ice if needed  · You may have an increase or change in your discomfort for 36-48 hours after your treatment  · Apply ice and continue with any pain medication you have been prescribed  · Notify [facility] if you have any of the following: redness, drainage, swelling, headache, stiff neck or fever above 100°F     SPECIAL INSTRUCTIONS  · Our office will contact you in approximately 7 days for a progress report  MEDICATIONS  · Continue to take all routine medications  · Our office may have instructed you to hold some medications  If you have a problem specifically related to your procedure, please call our office at [phone]  Problems not related to your procedure should be directed to your primary care physician

## 2019-12-20 ENCOUNTER — TELEPHONE (OUTPATIENT)
Dept: PAIN MEDICINE | Facility: CLINIC | Age: 59
End: 2019-12-20

## 2019-12-20 NOTE — TELEPHONE ENCOUNTER
Dr Mirta Raines Procedure F/u (S/p right L5-S1 TFESI   F/u w/DG 1/29  )       Spoke with pt who state sthat her pain level is at a 1 and that she received about 50% relief from the inj       Follow up with DG on 1/26

## 2020-01-29 ENCOUNTER — TELEPHONE (OUTPATIENT)
Dept: PAIN MEDICINE | Facility: CLINIC | Age: 60
End: 2020-01-29

## 2020-01-29 ENCOUNTER — OFFICE VISIT (OUTPATIENT)
Dept: PAIN MEDICINE | Facility: CLINIC | Age: 60
End: 2020-01-29
Payer: COMMERCIAL

## 2020-01-29 VITALS
HEART RATE: 77 BPM | HEIGHT: 69 IN | DIASTOLIC BLOOD PRESSURE: 72 MMHG | SYSTOLIC BLOOD PRESSURE: 130 MMHG | WEIGHT: 293 LBS | BODY MASS INDEX: 43.4 KG/M2

## 2020-01-29 DIAGNOSIS — M54.16 LUMBAR RADICULOPATHY: Primary | ICD-10-CM

## 2020-01-29 DIAGNOSIS — M43.10 SPONDYLOLISTHESIS, ACQUIRED: ICD-10-CM

## 2020-01-29 DIAGNOSIS — G89.29 CHRONIC RIGHT-SIDED LOW BACK PAIN WITHOUT SCIATICA: Primary | ICD-10-CM

## 2020-01-29 DIAGNOSIS — M48.062 SPINAL STENOSIS OF LUMBAR REGION WITH NEUROGENIC CLAUDICATION: ICD-10-CM

## 2020-01-29 DIAGNOSIS — M54.50 CHRONIC RIGHT-SIDED LOW BACK PAIN WITHOUT SCIATICA: Primary | ICD-10-CM

## 2020-01-29 DIAGNOSIS — M51.17 INTERVERTEBRAL DISC DISORDER WITH RADICULOPATHY OF LUMBOSACRAL REGION: ICD-10-CM

## 2020-01-29 DIAGNOSIS — M54.16 LUMBAR RADICULOPATHY: ICD-10-CM

## 2020-01-29 PROCEDURE — 99213 OFFICE O/P EST LOW 20 MIN: CPT | Performed by: NURSE PRACTITIONER

## 2020-01-29 RX ORDER — GABAPENTIN 600 MG/1
TABLET ORAL
Qty: 180 TABLET | Refills: 0 | Status: SHIPPED | OUTPATIENT
Start: 2020-01-29 | End: 2020-03-09 | Stop reason: SDUPTHER

## 2020-01-29 NOTE — TELEPHONE ENCOUNTER
Ok, I am fine with taking over the gabapentin  So for now, I want her to start taking Gabapentin 600 mg, 1 1/2 tablets per day x 2 weeks, then 1 PO BID  What pharmacy should I sent the gabapentin to? 30 day or 90 day? Thank you

## 2020-01-29 NOTE — PROGRESS NOTES
Assessment:  1  Chronic right-sided low back pain without sciatica    2  Lumbar radiculopathy    3  Intervertebral disc disorder with radiculopathy of lumbosacral region    4  Spondylolisthesis, acquired    5  Spinal stenosis of lumbar region with neurogenic claudication        Plan:  While the patient was in the office today, I did discuss with the patient at this point since she has had 3 epidural steroid injections and a knee injection with steroids, because of the overall steroid load, for now, we would have to try to hold off any repeat epidural steroid injections for at least the next 2-3 months, if not longer  The patient was agreeable and verbalized an understanding  I did encouraged the patient continue to follow-up with orthopedics regarding her left knee pain and Orthovisc injections as she is correct that if we can improve the knee pain her gait may improve which could also improve the stress and strain on her spine and her back and leg pain in time  The patient was agreeable and verbalized an understanding  However, I did discuss with the patient that a better long-term management plan would be did discuss with her neurologist the possibility of increasing her gabapentin to 1200 mg a day as that is a more therapeutic dose and I feel that could help with her lumbar radiculopathy and chronic pain symptoms more consistently  The patient is going to discuss that with her neurologist and I advised her that if her neurologist would feel more comfortable or prefer our office to take it over, she should call our office and we will gladly give her instructions on how to titrate up to the 1200 mg a day or more and take over prescribing the gabapentin in the future  The patient was agreeable and verbalized an understanding  I discussed with the patient that at this point time she can followup with our office on an as-needed basis   I did review the patient that if her pain symptoms should change, worsen, and/or if she would experience any new symptoms as she would like to be evaluated for, she should give our office a call  The patient was agreeable and verbalized an understanding  History of Present Illness: The patient is a 63 y o  female last seen on 12/13/19 who presents for a follow up office visit in regards to chronic right-sided low back pain with radiculopathy secondary to spondylolisthesis and intervertebral disc disorder with stenosis  The patient currently reports that since her last office visit overall there is at least 50-60% improvement in her pain symptoms as she is status post her 3rd L5-S1 interlaminar lumbar epidural steroid injection directed towards the left with Dr Eladio Inman on December 13, 2019  The patient also feels that part of her issue irritating her spine is the continued left knee pain as she continues to follow-up with orthopedics and has had an injection and 2 with steroids and just started her 1st Orthovisc injection last week  The patient is hopeful that by addressing the knee she will be able to walk a more normal gait as she does feel that it is affecting her spine and back pain as well  Current pain medications includes:  Gabapentin 600 mg daily and Cymbalta 30 mg daily as prescribed by her neurologist for her chronic hand neuropathy  I have personally reviewed and/or updated the patient's past medical history, past surgical history, family history, social history, current medications, allergies, and vital signs today  Review of Systems:    Review of Systems   Respiratory: Negative for shortness of breath  Cardiovascular: Negative for chest pain  Gastrointestinal: Negative for constipation, diarrhea, nausea and vomiting  Musculoskeletal: Positive for gait problem and joint swelling (joint stiffness)  Negative for arthralgias and myalgias  Skin: Negative for rash     Neurological: Negative for dizziness, seizures and weakness  All other systems reviewed and are negative  History reviewed  No pertinent past medical history  History reviewed  No pertinent surgical history  History reviewed  No pertinent family history  Social History     Occupational History    Not on file   Tobacco Use    Smoking status: Former Smoker    Smokeless tobacco: Never Used   Substance and Sexual Activity    Alcohol use: Yes     Frequency: Monthly or less     Drinks per session: 1 or 2     Binge frequency: Never    Drug use: Never    Sexual activity: Not Currently         Current Outpatient Medications:     ADVAIR DISKUS 250-50 MCG/DOSE inhaler, , Disp: , Rfl:     albuterol (PROVENTIL HFA,VENTOLIN HFA) 90 mcg/act inhaler, , Disp: , Rfl:     azelastine (ASTELIN) 0 1 % nasal spray, INHALE 1 SPRAY IN EACH NOSTRIL TWO TIMES A DAY, Disp: , Rfl: 11    clonazePAM (KlonoPIN) 0 5 mg tablet, , Disp: , Rfl:     colestipol (COLESTID) 1 g tablet, Take by mouth, Disp: , Rfl:     DULoxetine (CYMBALTA) 30 mg delayed release capsule, , Disp: , Rfl:     gabapentin (NEURONTIN) 600 MG tablet, , Disp: , Rfl:     hydrochlorothiazide (MICROZIDE) 12 5 mg capsule, Take 12 5 mg by mouth daily, Disp: , Rfl: 1    losartan (COZAAR) 50 mg tablet, Take 50 mg by mouth daily, Disp: , Rfl: 1    mometasone (NASONEX) 50 mcg/act nasal spray, , Disp: , Rfl:     ranitidine (ZANTAC) 300 MG tablet, , Disp: , Rfl:     Allergies   Allergen Reactions    Lisinopril        Physical Exam:    /72 (BP Location: Left arm, Patient Position: Standing, Cuff Size: Standard)   Pulse 77   Ht 5' 9" (1 753 m)   Wt (!) 156 kg (345 lb)   BMI 50 95 kg/m²     Constitutional:normal, well developed, well nourished, alert, in no distress and non-toxic and no overt pain behavior   and obese  Eyes:anicteric  HEENT:grossly intact  Neck:supple, symmetric, trachea midline and no masses   Pulmonary:even and unlabored  Cardiovascular:No edema or pitting edema present  Skin:Normal without rashes or lesions and well hydrated  Psychiatric:Mood and affect appropriate  Neurologic:Cranial Nerves II-XII grossly intact  Musculoskeletal:The patient's gait is slightly antalgic, but steady without the use of any assistive devices  Imaging  No orders to display         No orders of the defined types were placed in this encounter

## 2020-01-29 NOTE — TELEPHONE ENCOUNTER
S/w pt, advised of above  Pt verbalized understanding and requested a 90 day supply be sent to [facility] in [place] - in her chart

## 2020-01-29 NOTE — TELEPHONE ENCOUNTER
TEAGAN who saw the patient today asked the pt to call her Neurologist and ask them if they would manage the medication called gabapentin  Pt says Neuro told her they would prefer [facility] to manage gabapentin  She will be out of this medication next week  Please utilize [facility] on file   Ph# [phone]

## 2020-01-29 NOTE — TELEPHONE ENCOUNTER
I sent a 90 day script for the increase in the gabapentin as requested to the [facility] in her chart  She should schedule a f/u OV with me in 12 weeks for 15 mins  Thank you

## 2020-03-09 ENCOUNTER — TELEPHONE (OUTPATIENT)
Dept: PAIN MEDICINE | Facility: CLINIC | Age: 60
End: 2020-03-09

## 2020-03-09 DIAGNOSIS — M54.16 LUMBAR RADICULOPATHY: ICD-10-CM

## 2020-03-09 DIAGNOSIS — M48.062 SPINAL STENOSIS OF LUMBAR REGION WITH NEUROGENIC CLAUDICATION: ICD-10-CM

## 2020-03-09 RX ORDER — GABAPENTIN 600 MG/1
TABLET ORAL
Qty: 180 TABLET | Refills: 0
Start: 2020-03-09 | End: 2020-03-26

## 2020-03-09 NOTE — TELEPHONE ENCOUNTER
I would like her to try one more increase to 1500 mg per day by taking 0 5 in AM and 1 in Afternoon and 1 PO HS  She is to call at least 3-4 days before she would be out of medication and/or if she has side effects with the increase  If this does not help we may discuss trying a different medication such as Lyrica  Thank you

## 2020-03-09 NOTE — TELEPHONE ENCOUNTER
S/w pt, confirmed gabapentin 600 mg pm and hs w/ no se's, min improvement, if any, in her pain  Pt stated that she just picked up an rx recently - has plenty of medication on hand, unless it is increased  Advised pt, will d/w DG and cb to advise  Pt verbalized understanding and appreciation

## 2020-03-09 NOTE — TELEPHONE ENCOUNTER
Pt LMOM for DG stating that Gabapentin is not working that she is still having issues and would like her medication to be adjusted       She is experiencing sciatica in her rt buttock doing down her leg and foot  Pt can be reached at [phone]

## 2020-03-10 NOTE — TELEPHONE ENCOUNTER
S/w pt, advised of above  Do not drive or operate machinery until you know how this will affect you  Pt verbalized understanding and appreciation

## 2020-03-25 NOTE — TELEPHONE ENCOUNTER
Patient called stating that she is not getting any relief from Gabapentin medication 600 mg  She would like to speak to a nurse about her status & alternative options   Please advise, roberto    Call back# [phone]

## 2020-03-26 ENCOUNTER — TELEMEDICINE (OUTPATIENT)
Dept: PAIN MEDICINE | Facility: CLINIC | Age: 60
End: 2020-03-26
Payer: COMMERCIAL

## 2020-03-26 DIAGNOSIS — M54.50 CHRONIC RIGHT-SIDED LOW BACK PAIN WITHOUT SCIATICA: ICD-10-CM

## 2020-03-26 DIAGNOSIS — G89.29 CHRONIC RIGHT-SIDED LOW BACK PAIN WITHOUT SCIATICA: ICD-10-CM

## 2020-03-26 DIAGNOSIS — M51.17 INTERVERTEBRAL DISC DISORDER WITH RADICULOPATHY OF LUMBOSACRAL REGION: ICD-10-CM

## 2020-03-26 DIAGNOSIS — M48.062 SPINAL STENOSIS OF LUMBAR REGION WITH NEUROGENIC CLAUDICATION: ICD-10-CM

## 2020-03-26 DIAGNOSIS — G89.4 CHRONIC PAIN SYNDROME: Primary | ICD-10-CM

## 2020-03-26 DIAGNOSIS — M54.16 LUMBAR RADICULOPATHY: ICD-10-CM

## 2020-03-26 DIAGNOSIS — M43.10 SPONDYLOLISTHESIS, ACQUIRED: ICD-10-CM

## 2020-03-26 PROCEDURE — G2012 BRIEF CHECK IN BY MD/QHP: HCPCS | Performed by: ANESTHESIOLOGY

## 2020-03-26 RX ORDER — PREGABALIN 75 MG/1
CAPSULE ORAL
Qty: 90 CAPSULE | Refills: 1 | Status: SHIPPED | OUTPATIENT
Start: 2020-03-26 | End: 2020-04-22 | Stop reason: SDUPTHER

## 2020-03-26 NOTE — PROGRESS NOTES
Virtual Regular Visit    Problem List Items Addressed This Visit        Nervous and Auditory    Intervertebral disc disorder with radiculopathy of lumbosacral region    Lumbar radiculopathy    Relevant Medications    pregabalin (LYRICA) 75 mg capsule       Musculoskeletal and Integument    Spondylolisthesis, acquired       Other    Lower back pain    Relevant Medications    pregabalin (LYRICA) 75 mg capsule    Lumbar spinal stenosis    Relevant Medications    pregabalin (LYRICA) 75 mg capsule    Chronic pain syndrome - Primary    Relevant Medications    pregabalin (LYRICA) 75 mg capsule               Reason for visit is for an 8 week medication follow-up for her chronic pain syndrome  Encounter provider Manish Kennedy     Provider located at [address]  [address]  [place] [address] [address]  [phone]      Recent Visits  No visits were found meeting these conditions  Showing recent visits within past 7 days and meeting all other requirements     Today's Visits  Date Type Provider Dept   03/26/20 Telemedicine [facility]   Showing today's visits and meeting all other requirements     Future Appointments  No visits were found meeting these conditions  Showing future appointments within next 150 days and meeting all other requirements        After connecting through [employer], the patient was identified by name and date of birth  Hodan Wood was informed that this is a telemedicine visit and that the visit is being conducted through telephone which may not be secure and therefore, might not be HIPAA-compliant  My office door was closed  No one else was in the room  She acknowledged consent and understanding of privacy and security of the video platform  The patient has agreed to participate and understands they can discontinue the visit at any time      Jose Alvarado is a 62 y o  female who is currently concerned about the risks of COVID-19  The patient currently denies any of the signs or symptoms of COVID-19, but because of age or other comorbidities, medical history, and/or fear of exposure to COVID-19, the patient preferred to proceed with a virtual visit today  The patient is currently being treated for her chronic pain syndrome secondary to lumbar disc degeneration, with spondylolisthesis, stenosis, and right-sided lumbar radiculopathy, pain, and weakness  The patient reports that despite increasing the gabapentin to 1500 mg a day since her last office visit, she has not seen even moderate overall improvement, although she does deny any side effects from the gabapentin currently  The patient reports that if she stands or walks for any length of time the pain and radicular symptoms starts to significantly increase in the right side of her low back and buttocks and radiates down to her heel and the top of her right foot  She does report that if she sits for 15-20 minutes or even up to 1/2 hour the pain resolves and she can get back up and start walking again, but then very quickly the pain starts to return  She reports that if she leans on the cart in the grocery store the pain is much more tolerable and manageable  The patient reports she has been trying to use a home exercise and stretching program as well as he in ice with minimal to moderate improvement  The patient want to know what else she could do since the gabapentin does not seem to be providing even moderate or stable relief  No past medical history on file  No past surgical history on file      Current Outpatient Medications   Medication Sig Dispense Refill    ADVAIR DISKUS 250-50 MCG/DOSE inhaler       albuterol (PROVENTIL HFA,VENTOLIN HFA) 90 mcg/act inhaler       azelastine (ASTELIN) 0 1 % nasal spray INHALE 1 SPRAY IN EACH NOSTRIL TWO TIMES A DAY  11    clonazePAM (KlonoPIN) 0 5 mg tablet       colestipol (COLESTID) 1 g tablet Take by mouth      DULoxetine (CYMBALTA) 30 mg delayed release capsule       hydrochlorothiazide (MICROZIDE) 12 5 mg capsule Take 12 5 mg by mouth daily  1    losartan (COZAAR) 50 mg tablet Take 50 mg by mouth daily  1    mometasone (NASONEX) 50 mcg/act nasal spray       pregabalin (LYRICA) 75 mg capsule Take 1 PO QD x 4 days, then 1 PO BID x 4 days, then 1 PO TID  90 capsule 1    ranitidine (ZANTAC) 300 MG tablet        No current facility-administered medications for this visit  Allergies   Allergen Reactions    Lisinopril        Review of Systems    Plan:  1  I discussed with the patient at this point we need to continue to hold off procedures because of her overall steroid load and the current corona virus situation  The patient was agreeable and verbalized an understanding  2  I explained to the patient that since she is on a therapeutic dose of gabapentin, without relief or side effects, and I still feel there is a significant underlying neuropathic component, I would like to titrate her off of the gabapentin and on to Lyrica  The patient is to decrease her gabapentin down to 2 doses per day and start 1 dose of Lyrica for 4 days  Then after the 4 days she is to decrease the gabapentin down to 1 dose a day and increase the Lyrica up to 2 doses per day for 4 days  Then the patient is to discontinue the gabapentin altogether and increase the Lyrica to 3 doses per day  I advised the patient that after she is on the Lyrica 3 doses a day for 1 week, she is to call our office, or call sooner if she has side effects, to let us know how she is doing on the Lyrica and we may further increase the Lyrica to 4 doses per day which is right at the bottom of the therapeutic dosing of Lyrica depending on how she is doing    I advised the patient that if they experience any side effects or issues with the changes in their medication regiment, they should give our office a call to discuss  I also advised the patient not to drive or operate machinery until they see how the changes in the medication regimen affects them  The patient was agreeable and verbalized an understanding  3  The patient is to follow-up as scheduled in 4 weeks on April 22, 2020 to arrive and 7:15 a m  For 7:30 a m  Office visit  The patient was agreeable and verbalized an understanding  I spent 20 minutes with the patient during this visit      Normal office visit:  [id number]

## 2020-03-26 NOTE — PATIENT INSTRUCTIONS
Plan:  1  I discussed with the patient at this point we need to continue to hold off procedures because of her overall steroid load and the current corona virus situation  The patient was agreeable and verbalized an understanding  2  I explained to the patient that since she is on a therapeutic dose of gabapentin, without relief or side effects, and I still feel there is a significant underlying neuropathic component, I would like to titrate her off of the gabapentin and on to Lyrica  The patient is to decrease her gabapentin down to 2 doses per day and start 1 dose of Lyrica for 4 days  Then after the 4 days she is to decrease the gabapentin down to 1 dose a day and increase the Lyrica up to 2 doses per day for 4 days  Then the patient is to discontinue the gabapentin altogether and increase the Lyrica to 3 doses per day  I advised the patient that after she is on the Lyrica 3 doses a day for 1 week, she is to call our office, or call sooner if she has side effects, to let us know how she is doing on the Lyrica and we may further increase the Lyrica to 4 doses per day which is right at the bottom of the therapeutic dosing of Lyrica depending on how she is doing  I advised the patient that if they experience any side effects or issues with the changes in their medication regiment, they should give our office a call to discuss  I also advised the patient not to drive or operate machinery until they see how the changes in the medication regimen affects them  The patient was agreeable and verbalized an understanding  3  The patient is to follow-up as scheduled in 4 weeks on April 22, 2020 to arrive and 7:15 a m  For 7:30 a m  Office visit  The patient was agreeable and verbalized an understanding  Pregabalin (By mouth)   Pregabalin ([id number])  Treats nerve and muscle pain, including fibromyalgia  Also treats seizures  Brand Name(s): Lyrica   There may be other brand names for this medicine    When This Medicine Should Not Be Used: This medicine is not right for everyone  Do not use it if you had an allergic reaction to pregabalin  How to Use This Medicine:   Capsule, Liquid  · Take your medicine as directed  Your dose may need to be changed several times to find what works best for you  · Measure the oral liquid medicine with a marked measuring spoon, oral syringe, or medicine cup  · This medicine should come with a Medication Guide  Ask your pharmacist for a copy if you do not have one  · Missed dose: Take a dose as soon as you remember  If it is almost time for your next dose, wait until then and take a regular dose  Do not take extra medicine to make up for a missed dose  · Store the medicine in a closed container at room temperature, away from heat, moisture, and direct light  Drugs and Foods to Avoid:   Ask your doctor or pharmacist before using any other medicine, including over-the-counter medicines, vitamins, and herbal products  · Some medicines can affect how pregabalin works  Tell your doctor if you are using any of the following:   ¨ Diabetes medicine that you take by mouth (pioglitazone, rosiglitazone)  ¨ An ACE inhibitor (benazepril, enalapril, lisinopril, quinapril, ramipril)  · Tell your doctor if you use anything else that makes you sleepy  Some examples are allergy medicine, narcotic pain medicine, and alcohol  Warnings While Using This Medicine:   · Tell your doctor if you are pregnant or breastfeeding, or if you have kidney disease, heart failure, heart rhythm problems, angioedema, a bleeding disorder, or a low blood platelet count  Tell your doctor if you have a history of alcohol or drug abuse, depression, or other mood problems  · This medicine may cause the following problems:   ¨ Serious allergic reactions  ¨ Suicidal thoughts  · This medicine may make you dizzy or drowsy  It may also cause blurry or double vision   Do not drive or do anything that could be dangerous until you know how this medicine affects you  · Do not stop using this medicine suddenly  Your doctor will need to slowly decrease your dose before you stop it completely  · Your doctor will check your progress and the effects of this medicine at regular visits  Keep all appointments  · Keep all medicine out of the reach of children  Never share your medicine with anyone  Possible Side Effects While Using This Medicine:   Call your doctor right away if you notice any of these side effects:  · Allergic reaction: Itching or hives, swelling in your face or hands, swelling or tingling in your mouth or throat, chest tightness, trouble breathing  · Blistering, peeling, red skin rash  · Blurry or double vision  · Fever, chills, cough, sore throat, and body aches  · Muscle pain, tenderness, or weakness, fever, or general ill feeling  · Rapid weight gain, swelling in your hands, ankles, or feet  · Severe dizziness or drowsiness  · Sudden mood changes, unusual thoughts or behavior such as extreme happiness or depression  · Suicidal thoughts  · Swelling in your throat, head, or neck  · Uneven heartbeat  · Unusual bleeding, bruising, or weakness  If you notice these less serious side effects, talk with your doctor:   · Confusion, trouble concentrating  · Constipation  · Dry mouth  If you notice other side effects that you think are caused by this medicine, tell your doctor  Call your doctor for medical advice about side effects  You may report side effects to FDA at 7-927-FDA-3981  © 2017 [employer] Information is for End User's use only and may not be sold, redistributed or otherwise used for commercial purposes  The above information is an  only  It is not intended as medical advice for individual conditions or treatments  Talk to your doctor, nurse or pharmacist before following any medical regimen to see if it is safe and effective for you

## 2020-04-16 NOTE — TELEPHONE ENCOUNTER
Pt called and stated the new dose of Lyrica is not providing enough relief  It did help with the sciatic in her buttocks  But now she is getting a stabbing feeling in her right leg and foot and an annoying pain in her foot/ankle area      Please advise    Pt can be reached at [phone]

## 2020-04-16 NOTE — TELEPHONE ENCOUNTER
I called the patient and she reports that there has been some improvement with the Lyrica and is currently taking 75 mg t i d  Without any side effects  However, she is still have in pain in her buttocks and down to her ankle and leg  At this point time I advised the patient that she has to increase the Lyrica to 75 mg q i d  And we will read group on how she is doing at her virtual visit next week  I also reminded the patient that our goal is to achieve at least a 50% reduction of her pain symptoms, 50% of the time, on the least amount of medications, with the least amount of side effects  The patient was agreeable and verbalized an understanding

## 2020-04-18 DIAGNOSIS — M54.16 LUMBAR RADICULOPATHY: ICD-10-CM

## 2020-04-18 DIAGNOSIS — M48.062 SPINAL STENOSIS OF LUMBAR REGION WITH NEUROGENIC CLAUDICATION: ICD-10-CM

## 2020-04-18 RX ORDER — GABAPENTIN 600 MG/1
TABLET ORAL
Qty: 180 TABLET | Refills: 0 | OUTPATIENT
Start: 2020-04-18

## 2020-04-22 ENCOUNTER — TELEMEDICINE (OUTPATIENT)
Dept: PAIN MEDICINE | Facility: CLINIC | Age: 60
End: 2020-04-22
Payer: COMMERCIAL

## 2020-04-22 DIAGNOSIS — M54.2 NECK PAIN: ICD-10-CM

## 2020-04-22 DIAGNOSIS — M54.16 LUMBAR RADICULOPATHY: ICD-10-CM

## 2020-04-22 DIAGNOSIS — G89.4 CHRONIC PAIN SYNDROME: Primary | ICD-10-CM

## 2020-04-22 DIAGNOSIS — M48.02 FORAMINAL STENOSIS OF CERVICAL REGION: ICD-10-CM

## 2020-04-22 DIAGNOSIS — M43.10 SPONDYLOLISTHESIS, ACQUIRED: ICD-10-CM

## 2020-04-22 DIAGNOSIS — M51.17 INTERVERTEBRAL DISC DISORDER WITH RADICULOPATHY OF LUMBOSACRAL REGION: ICD-10-CM

## 2020-04-22 DIAGNOSIS — G89.29 CHRONIC RIGHT-SIDED LOW BACK PAIN WITHOUT SCIATICA: ICD-10-CM

## 2020-04-22 DIAGNOSIS — M54.50 CHRONIC RIGHT-SIDED LOW BACK PAIN WITHOUT SCIATICA: ICD-10-CM

## 2020-04-22 DIAGNOSIS — M48.062 SPINAL STENOSIS OF LUMBAR REGION WITH NEUROGENIC CLAUDICATION: ICD-10-CM

## 2020-04-22 PROCEDURE — 99214 OFFICE O/P EST MOD 30 MIN: CPT | Performed by: NURSE PRACTITIONER

## 2020-04-22 RX ORDER — PREGABALIN 150 MG/1
CAPSULE ORAL
Qty: 90 CAPSULE | Refills: 2 | Status: SHIPPED | OUTPATIENT
Start: 2020-04-22 | End: 2020-07-27 | Stop reason: SDUPTHER

## 2020-06-01 ENCOUNTER — TELEPHONE (OUTPATIENT)
Dept: PAIN MEDICINE | Facility: MEDICAL CENTER | Age: 60
End: 2020-06-01

## 2020-06-01 DIAGNOSIS — G89.29 CHRONIC RIGHT-SIDED LOW BACK PAIN WITHOUT SCIATICA: ICD-10-CM

## 2020-06-01 DIAGNOSIS — M54.16 LUMBAR RADICULOPATHY: Primary | ICD-10-CM

## 2020-06-01 DIAGNOSIS — M48.062 SPINAL STENOSIS OF LUMBAR REGION WITH NEUROGENIC CLAUDICATION: ICD-10-CM

## 2020-06-01 DIAGNOSIS — M54.50 CHRONIC RIGHT-SIDED LOW BACK PAIN WITHOUT SCIATICA: ICD-10-CM

## 2020-06-29 ENCOUNTER — TELEPHONE (OUTPATIENT)
Dept: PAIN MEDICINE | Facility: CLINIC | Age: 60
End: 2020-06-29

## 2020-06-30 ENCOUNTER — TELEPHONE (OUTPATIENT)
Dept: PAIN MEDICINE | Facility: CLINIC | Age: 60
End: 2020-06-30

## 2020-06-30 DIAGNOSIS — M48.062 SPINAL STENOSIS OF LUMBAR REGION WITH NEUROGENIC CLAUDICATION: ICD-10-CM

## 2020-06-30 DIAGNOSIS — M54.50 CHRONIC RIGHT-SIDED LOW BACK PAIN WITHOUT SCIATICA: ICD-10-CM

## 2020-06-30 DIAGNOSIS — M51.17 INTERVERTEBRAL DISC DISORDER WITH RADICULOPATHY OF LUMBOSACRAL REGION: ICD-10-CM

## 2020-06-30 DIAGNOSIS — M48.02 FORAMINAL STENOSIS OF CERVICAL REGION: ICD-10-CM

## 2020-06-30 DIAGNOSIS — M54.16 LUMBAR RADICULOPATHY: Primary | ICD-10-CM

## 2020-06-30 DIAGNOSIS — M43.10 SPONDYLOLISTHESIS, ACQUIRED: ICD-10-CM

## 2020-06-30 DIAGNOSIS — G89.29 CHRONIC RIGHT-SIDED LOW BACK PAIN WITHOUT SCIATICA: ICD-10-CM

## 2020-07-17 DIAGNOSIS — M54.16 LUMBAR RADICULOPATHY: ICD-10-CM

## 2020-07-17 DIAGNOSIS — M48.062 SPINAL STENOSIS OF LUMBAR REGION WITH NEUROGENIC CLAUDICATION: ICD-10-CM

## 2020-07-17 RX ORDER — GABAPENTIN 600 MG/1
TABLET ORAL
Qty: 180 TABLET | Refills: 0 | OUTPATIENT
Start: 2020-07-17

## 2020-07-19 DIAGNOSIS — G89.4 CHRONIC PAIN SYNDROME: ICD-10-CM

## 2020-07-19 DIAGNOSIS — G89.29 CHRONIC RIGHT-SIDED LOW BACK PAIN WITHOUT SCIATICA: ICD-10-CM

## 2020-07-19 DIAGNOSIS — M48.062 SPINAL STENOSIS OF LUMBAR REGION WITH NEUROGENIC CLAUDICATION: ICD-10-CM

## 2020-07-19 DIAGNOSIS — M54.50 CHRONIC RIGHT-SIDED LOW BACK PAIN WITHOUT SCIATICA: ICD-10-CM

## 2020-07-19 DIAGNOSIS — M54.16 LUMBAR RADICULOPATHY: ICD-10-CM

## 2020-07-20 RX ORDER — PREGABALIN 150 MG/1
CAPSULE ORAL
Qty: 90 CAPSULE | Refills: 2 | OUTPATIENT
Start: 2020-07-20

## 2020-07-26 DIAGNOSIS — G89.4 CHRONIC PAIN SYNDROME: ICD-10-CM

## 2020-07-26 DIAGNOSIS — M54.50 CHRONIC RIGHT-SIDED LOW BACK PAIN WITHOUT SCIATICA: ICD-10-CM

## 2020-07-26 DIAGNOSIS — G89.29 CHRONIC RIGHT-SIDED LOW BACK PAIN WITHOUT SCIATICA: ICD-10-CM

## 2020-07-26 DIAGNOSIS — M48.062 SPINAL STENOSIS OF LUMBAR REGION WITH NEUROGENIC CLAUDICATION: ICD-10-CM

## 2020-07-26 DIAGNOSIS — M54.16 LUMBAR RADICULOPATHY: ICD-10-CM

## 2020-07-27 ENCOUNTER — TELEPHONE (OUTPATIENT)
Dept: PAIN MEDICINE | Facility: CLINIC | Age: 60
End: 2020-07-27

## 2020-07-27 DIAGNOSIS — M54.16 LUMBAR RADICULOPATHY: ICD-10-CM

## 2020-07-27 DIAGNOSIS — M54.50 CHRONIC RIGHT-SIDED LOW BACK PAIN WITHOUT SCIATICA: ICD-10-CM

## 2020-07-27 DIAGNOSIS — G89.4 CHRONIC PAIN SYNDROME: ICD-10-CM

## 2020-07-27 DIAGNOSIS — G89.29 CHRONIC RIGHT-SIDED LOW BACK PAIN WITHOUT SCIATICA: ICD-10-CM

## 2020-07-27 DIAGNOSIS — M48.062 SPINAL STENOSIS OF LUMBAR REGION WITH NEUROGENIC CLAUDICATION: ICD-10-CM

## 2020-07-27 RX ORDER — PREGABALIN 150 MG/1
CAPSULE ORAL
Qty: 90 CAPSULE | Refills: 1 | Status: SHIPPED | OUTPATIENT
Start: 2020-07-27 | End: 2020-10-15

## 2020-07-27 RX ORDER — PREGABALIN 150 MG/1
CAPSULE ORAL
Qty: 90 CAPSULE | Refills: 2 | OUTPATIENT
Start: 2020-07-27

## 2020-07-27 NOTE — TELEPHONE ENCOUNTER
Pt contacted Call Center requested refill of their medication  Medication Name: Pregablin      Dosage of Med: 150 mg      Frequency of Med: Take 1 PO TID      Remaining Medication: supply until 7/30/20      Pharmacy and Location: [facility] pharmacy on file        Pt  Preferred Callback Phone Number: [phone]      Thank you

## 2020-07-27 NOTE — TELEPHONE ENCOUNTER
S/w pt, advised of the same  Pt verbalizd understanding  Appt scheduled for 9/18 at 7:30  Pt aware to cb with any questions or concerns prior to appt

## 2020-07-27 NOTE — TELEPHONE ENCOUNTER
S/w pt regarding refill request, pt denies s/e and states Lyrica is "holding it's own" as far as efficacy  Pt last appt was virtual telemed on 4/22  Pt had appt on 7/20 that was cancelled, offered to reschedule, pt declined stating she would like to see Dr Annabel Schmitt first  Appt with Dr Annabel Schmitt on 8/11  Please advise, thank you

## 2020-07-27 NOTE — TELEPHONE ENCOUNTER
I sent a 30 day script with a refill for the Lyrica  Please have the patient schedule a f/u OV with me in 8 weeks to re-group  Thank you

## 2020-08-11 ENCOUNTER — CONSULT (OUTPATIENT)
Dept: NEUROSURGERY | Facility: CLINIC | Age: 60
End: 2020-08-11
Payer: COMMERCIAL

## 2020-08-11 VITALS
WEIGHT: 293 LBS | HEART RATE: 74 BPM | BODY MASS INDEX: 43.4 KG/M2 | TEMPERATURE: 98.4 F | DIASTOLIC BLOOD PRESSURE: 72 MMHG | HEIGHT: 69 IN | SYSTOLIC BLOOD PRESSURE: 122 MMHG | RESPIRATION RATE: 16 BRPM

## 2020-08-11 DIAGNOSIS — M54.50 CHRONIC RIGHT-SIDED LOW BACK PAIN WITHOUT SCIATICA: ICD-10-CM

## 2020-08-11 DIAGNOSIS — M48.062 SPINAL STENOSIS OF LUMBAR REGION WITH NEUROGENIC CLAUDICATION: ICD-10-CM

## 2020-08-11 DIAGNOSIS — M51.17 INTERVERTEBRAL DISC DISORDER WITH RADICULOPATHY OF LUMBOSACRAL REGION: ICD-10-CM

## 2020-08-11 DIAGNOSIS — G89.29 CHRONIC RIGHT-SIDED LOW BACK PAIN WITHOUT SCIATICA: ICD-10-CM

## 2020-08-11 DIAGNOSIS — M54.16 LUMBAR RADICULOPATHY: ICD-10-CM

## 2020-08-11 DIAGNOSIS — M43.10 SPONDYLOLISTHESIS, ACQUIRED: ICD-10-CM

## 2020-08-11 PROCEDURE — 99203 OFFICE O/P NEW LOW 30 MIN: CPT | Performed by: NEUROLOGICAL SURGERY

## 2020-08-11 RX ORDER — FERROUS SULFATE 325(65) MG
325 TABLET ORAL 2 TIMES DAILY WITH MEALS
COMMUNITY

## 2020-08-11 RX ORDER — COVID-19 ANTIGEN TEST
KIT MISCELLANEOUS ONCE AS NEEDED
COMMUNITY
End: 2020-10-29 | Stop reason: HOSPADM

## 2020-08-11 RX ORDER — FAMOTIDINE 40 MG/1
40 TABLET, FILM COATED ORAL EVERY 12 HOURS
COMMUNITY
Start: 2020-07-12

## 2020-08-11 NOTE — PROGRESS NOTES
DISCUSSION SUMMARY  This is a 62 y o  female with a long history of low back pain secondary to spondylolisthesis as well as spondyloarthropathy  I have recommended that she undergo a minimally invasive fixation fusion of the LS spine and TLIF  She has a family member who will be  in October and she would like to have this surgical procedure done following this  Return in about 3 months (around 11/11/2020) for after tests completed  Diagnosis ICD-10-CM Associated Orders   1  Lumbar radiculopathy  M54 16 Ambulatory referral to Neurosurgery     X-ray lumbar spine flexion and extension only 4+ views   2  Spinal stenosis of lumbar region with neurogenic claudication  M48 062 Ambulatory referral to Neurosurgery     X-ray lumbar spine flexion and extension only 4+ views   3  Chronic right-sided low back pain without sciatica  M54 5 Ambulatory referral to Neurosurgery    G89 29 X-ray lumbar spine flexion and extension only 4+ views   4  Intervertebral disc disorder with radiculopathy of lumbosacral region  M51 17 Ambulatory referral to Neurosurgery     X-ray lumbar spine flexion and extension only 4+ views   5  Spondylolisthesis, acquired  M43 10 Ambulatory referral to Neurosurgery     X-ray lumbar spine flexion and extension only 4+ views          Type of Visit:     Consult and Back Pain    HPI:    Back Pain   This is a chronic problem  The problem occurs intermittently  The problem is unchanged  Pain location: R buttocks and R foot  Associated symptoms include bladder incontinence (occasional), headaches (occasional), numbness (R leg at times) and weakness (R leg at times)  Pertinent negatives include no leg pain or tingling  Risk factors include obesity  Treatments tried: EDSI x2 last year and x2 in 2017 both by Dr Kamilah Fraire  Improvement on treatment: Temporary relief  She recalls as a young adult having low back pain  This pain progressed in nature    She enjoys playing [employer] requires her to get out and look for different clues in the environment  She does this in a park but can no longer walk significant distances  In fact her functionality and has been reduced to such an extent that she cannot enjoy her life  She tells me that her left leg has given out from knee pain in the past but not from her back  She does feel that her symptoms are progressive and worsening  She cannot walk significant distances in a shopping center  When her friends go to is due for instance she cannot keep up with them  This is isolating for her as she cannot participate in the things that she once did  Her job requires her to sit at a computer for extensive periods of time  To get into work she needs to traverse a flight of stairs which is difficult for her to accomplish  She has weakness and numbness of the right leg greater than the left side  She complains of severe back pain which worsens her ability to move  When her pain is severe she be leans forward which allows for the pain to nuria and then she progresses on  REVIEW OF SYSTEMS  Review of Systems   Constitutional: Positive for activity change (Trouble with steps)  HENT: Negative  Eyes: Negative  Respiratory: Negative  Cardiovascular: Negative  Gastrointestinal: Negative  Endocrine: Negative  Genitourinary: Positive for bladder incontinence (occasional)  Musculoskeletal: Positive for gait problem and neck pain  Negative for back pain (pain in R buttocks and R foot)  Skin: Negative  Allergic/Immunologic: Negative  Neurological: Positive for weakness (R leg at times), numbness (R leg at times) and headaches (occasional)  Negative for tingling  Hematological: Negative  Psychiatric/Behavioral: Negative  All other systems reviewed and are negative  I reviewed the ROS  I reviewed the ROS      Vitals:    /72 (BP Location: Right arm, Patient Position: Sitting, Cuff Size: Standard)   Pulse 74   Temp 98 4 °F (36 9 °C) (Probe)   Resp 16   Ht 5' 9" (1 753 m)   Wt (!) 154 kg (340 lb)   BMI 50 21 kg/m²     MEDICAL HISTORY  Past Medical History:   Diagnosis Date    History of arthroplasty     History of spinal cord injury     Low back pain     S/P cervical spinal fusion      Past Surgical History:   Procedure Laterality Date    CERVICAL DISC ARTHROPLASTY      CERVICAL FUSION       Social History     Tobacco Use    Smoking status: Former Smoker    Smokeless tobacco: Never Used   Substance Use Topics    Alcohol use: Not Currently    Drug use: Never        Current Outpatient Medications:     ADVAIR DISKUS 250-50 MCG/DOSE inhaler, Inhale 1 puff daily , Disp: , Rfl:     albuterol (PROVENTIL HFA,VENTOLIN HFA) 90 mcg/act inhaler, Inhale 1 puff as needed , Disp: , Rfl:     azelastine (ASTELIN) 0 1 % nasal spray, 1 spray into each nostril daily , Disp: , Rfl: 11    Calcium Carbonate-Vitamin D (CALCIUM 500 + D PO), Take 2 tablets by mouth daily, Disp: , Rfl:     Calcium Polycarbophil (FIBER-CAPS PO), Take by mouth daily, Disp: , Rfl:     clonazePAM (KlonoPIN) 0 125 mg disintegrating tablet, Take by mouth daily , Disp: , Rfl:     colestipol (COLESTID) 1 g tablet, Take 1 g by mouth daily , Disp: , Rfl:     DULoxetine (CYMBALTA) 30 mg delayed release capsule, Take 30 mg by mouth 2 (two) times a day , Disp: , Rfl:     famotidine (PEPCID) 40 MG tablet, Take 40 mg by mouth every 12 (twelve) hours, Disp: , Rfl:     ferrous sulfate 325 (65 Fe) mg tablet, Take 325 mg by mouth 2 (two) times a day with meals, Disp: , Rfl:     Glucosamine-Chondroit-Vit C-Mn (GLUCOSAMINE CHONDR 500 COMPLEX PO), Take 2 tablets by mouth daily, Disp: , Rfl:     hydrochlorothiazide (MICROZIDE) 12 5 mg capsule, Take 12 5 mg by mouth daily, Disp: , Rfl: 1    losartan (COZAAR) 50 mg tablet, Take 50 mg by mouth daily, Disp: , Rfl: 1    mometasone (NASONEX) 50 mcg/act nasal spray, 1 spray into each nostril daily , Disp: , Rfl:     Multiple Vitamins-Minerals (CENTRUM SILVER PO), Take by mouth daily, Disp: , Rfl:     Naproxen Sodium (Aleve) 220 MG CAPS, Take by mouth once as needed, Disp: , Rfl:     pregabalin (LYRICA) 150 mg capsule, Take 1 PO TID , Disp: 90 capsule, Rfl: 1     Allergies   Allergen Reactions    Lisinopril         The following portions of the patient's history were updated by MA and reviewed by MD: allergies, current medications, past family history, past medical history, past social history, past surgical history and problem list     Physical Exam  Constitutional:       General: She is not in acute distress  Appearance: Normal appearance  She is obese  She is not ill-appearing or toxic-appearing  HENT:      Head: Normocephalic  Nose: Nose normal    Eyes:      Extraocular Movements: Extraocular movements intact  Conjunctiva/sclera: Conjunctivae normal       Pupils: Pupils are equal, round, and reactive to light  Neck:      Musculoskeletal: Normal range of motion and neck supple  Musculoskeletal: Normal range of motion  General: No swelling, tenderness, deformity or signs of injury  Skin:     General: Skin is warm and dry  Neurological:      General: No focal deficit present  Mental Status: She is alert and oriented to person, place, and time  Mental status is at baseline  Cranial Nerves: No cranial nerve deficit  Sensory: Sensory deficit ( she has a diminished shin to fine touch and pinprick in the L5 and S1 distributions bilaterally  Her deep vibratory sense is reduced bilaterally greater on the right than the left ) present  Motor: Weakness (She has weakness bilaterally in the lower extremities with the right side worse than the left) present  Coordination: Coordination abnormal ( she has difficulty coordinating movement especially in the lower extremities in the right greater than left)  Gait: Gait abnormal ( she has an abnormal gait and cannot perform tandem gait  She ambulates with a wide-based gait  She cannot perform toe walk  )  Deep Tendon Reflexes: Reflexes abnormal    Psychiatric:         Mood and Affect: Mood normal          Behavior: Behavior normal          Thought Content: Thought content normal          Judgment: Judgment normal          RESULTS/DATA  I have personally reviewed pertinent films in PACS     An MRI of the LS spine is carefully reviewed  This demonstrates spondylosis with spondylolisthesis in the LS spine  Specifically at the L5-S1 region there is a grade 2 spondylolisthesis with a spondylo lysis  There is widening of the facet joints with excessive fluid at the L4-5 and L3-4 levels  There appears to be clumping of the nerve roots beneath the L5-S1 level  There is a extradural soft tissue mass which likely represents scar in increased numbers of blood vessels

## 2020-09-21 ENCOUNTER — HOSPITAL ENCOUNTER (OUTPATIENT)
Dept: RADIOLOGY | Facility: HOSPITAL | Age: 60
Discharge: HOME | End: 2020-09-21
Attending: NEUROLOGICAL SURGERY
Payer: COMMERCIAL

## 2020-09-21 DIAGNOSIS — M48.062 SPINAL STENOSIS OF LUMBAR REGION WITH NEUROGENIC CLAUDICATION: ICD-10-CM

## 2020-09-21 DIAGNOSIS — M43.10 SPONDYLOLISTHESIS, ACQUIRED: ICD-10-CM

## 2020-09-21 DIAGNOSIS — M51.17 INTERVERTEBRAL DISC DISORDER WITH RADICULOPATHY OF LUMBOSACRAL REGION: ICD-10-CM

## 2020-09-21 DIAGNOSIS — G89.29 CHRONIC RIGHT-SIDED LOW BACK PAIN WITHOUT SCIATICA: ICD-10-CM

## 2020-09-21 DIAGNOSIS — M54.16 LUMBAR RADICULOPATHY: ICD-10-CM

## 2020-09-21 DIAGNOSIS — M54.50 CHRONIC RIGHT-SIDED LOW BACK PAIN WITHOUT SCIATICA: ICD-10-CM

## 2020-09-21 PROCEDURE — 72114 X-RAY EXAM L-S SPINE BENDING: CPT

## 2020-10-14 ENCOUNTER — TELEPHONE (OUTPATIENT)
Dept: NEUROSURGERY | Facility: CLINIC | Age: 60
End: 2020-10-14

## 2020-10-15 ENCOUNTER — OFFICE VISIT (OUTPATIENT)
Dept: NEUROSURGERY | Facility: CLINIC | Age: 60
End: 2020-10-15
Payer: COMMERCIAL

## 2020-10-15 VITALS
TEMPERATURE: 98.2 F | RESPIRATION RATE: 16 BRPM | SYSTOLIC BLOOD PRESSURE: 133 MMHG | HEART RATE: 77 BPM | WEIGHT: 293 LBS | DIASTOLIC BLOOD PRESSURE: 77 MMHG | BODY MASS INDEX: 43.4 KG/M2 | HEIGHT: 69 IN

## 2020-10-15 DIAGNOSIS — E66.01 MORBID OBESITY DUE TO EXCESS CALORIES (HCC): ICD-10-CM

## 2020-10-15 DIAGNOSIS — M48.061 SPINAL STENOSIS OF LUMBAR REGION WITHOUT NEUROGENIC CLAUDICATION: ICD-10-CM

## 2020-10-15 DIAGNOSIS — M54.2 NECK PAIN: ICD-10-CM

## 2020-10-15 DIAGNOSIS — M43.10 SPONDYLOLISTHESIS, ACQUIRED: Primary | ICD-10-CM

## 2020-10-15 PROCEDURE — 99213 OFFICE O/P EST LOW 20 MIN: CPT | Performed by: NEUROLOGICAL SURGERY

## 2020-10-15 RX ORDER — CHLORHEXIDINE GLUCONATE 0.12 MG/ML
15 RINSE ORAL ONCE
Status: CANCELLED | OUTPATIENT
Start: 2020-10-15 | End: 2020-10-15

## 2020-10-15 RX ORDER — LOSARTAN POTASSIUM AND HYDROCHLOROTHIAZIDE 12.5; 5 MG/1; MG/1
1 TABLET ORAL DAILY
COMMUNITY

## 2020-10-15 RX ORDER — GABAPENTIN 600 MG/1
600 TABLET ORAL DAILY
COMMUNITY

## 2020-10-19 ENCOUNTER — HOSPITAL ENCOUNTER (OUTPATIENT)
Dept: NON INVASIVE DIAGNOSTICS | Facility: CLINIC | Age: 60
Discharge: HOME | End: 2020-10-19
Payer: COMMERCIAL

## 2020-10-19 ENCOUNTER — LAB (OUTPATIENT)
Dept: LAB | Facility: CLINIC | Age: 60
End: 2020-10-19
Payer: COMMERCIAL

## 2020-10-19 ENCOUNTER — TRANSCRIBE ORDERS (OUTPATIENT)
Dept: ADMINISTRATIVE | Facility: HOSPITAL | Age: 60
End: 2020-10-19

## 2020-10-19 ENCOUNTER — APPOINTMENT (OUTPATIENT)
Dept: LAB | Facility: HOSPITAL | Age: 60
DRG: 516 | End: 2020-10-19
Payer: COMMERCIAL

## 2020-10-19 DIAGNOSIS — E66.01 MORBID OBESITY DUE TO EXCESS CALORIES (HCC): ICD-10-CM

## 2020-10-19 DIAGNOSIS — M48.061 SPINAL STENOSIS OF LUMBAR REGION WITHOUT NEUROGENIC CLAUDICATION: ICD-10-CM

## 2020-10-19 DIAGNOSIS — Z01.812 PRE-OPERATIVE LABORATORY EXAMINATION: ICD-10-CM

## 2020-10-19 DIAGNOSIS — M43.10 SPONDYLOLISTHESIS, ACQUIRED: ICD-10-CM

## 2020-10-19 DIAGNOSIS — Z01.812 PRE-OPERATIVE LABORATORY EXAMINATION: Primary | ICD-10-CM

## 2020-10-19 DIAGNOSIS — M54.2 NECK PAIN: ICD-10-CM

## 2020-10-19 LAB
ABO GROUP BLD: NORMAL
APTT PPP: 30 SECONDS (ref 23–37)
BASOPHILS # BLD AUTO: 0.03 THOUSANDS/ΜL (ref 0–0.1)
BASOPHILS NFR BLD AUTO: 1 % (ref 0–1)
BILIRUB UR QL STRIP: NEGATIVE
BLD GP AB SCN SERPL QL: NEGATIVE
CLARITY UR: CLEAR
COLOR UR: YELLOW
EOSINOPHIL # BLD AUTO: 0.18 THOUSAND/ΜL (ref 0–0.61)
EOSINOPHIL NFR BLD AUTO: 4 % (ref 0–6)
ERYTHROCYTE [DISTWIDTH] IN BLOOD BY AUTOMATED COUNT: 14.6 % (ref 11.6–15.1)
EST. AVERAGE GLUCOSE BLD GHB EST-MCNC: 91 MG/DL
GLUCOSE UR STRIP-MCNC: NEGATIVE MG/DL
HBA1C MFR BLD: 4.8 %
HCT VFR BLD AUTO: 42.8 % (ref 34.8–46.1)
HGB BLD-MCNC: 14.3 G/DL (ref 11.5–15.4)
HGB UR QL STRIP.AUTO: NEGATIVE
IMM GRANULOCYTES # BLD AUTO: 0.01 THOUSAND/UL (ref 0–0.2)
IMM GRANULOCYTES NFR BLD AUTO: 0 % (ref 0–2)
INR PPP: 1 (ref 0.84–1.19)
KETONES UR STRIP-MCNC: NEGATIVE MG/DL
LEUKOCYTE ESTERASE UR QL STRIP: NEGATIVE
LYMPHOCYTES # BLD AUTO: 1.05 THOUSANDS/ΜL (ref 0.6–4.47)
LYMPHOCYTES NFR BLD AUTO: 21 % (ref 14–44)
MCH RBC QN AUTO: 30.4 PG (ref 26.8–34.3)
MCHC RBC AUTO-ENTMCNC: 33.4 G/DL (ref 31.4–37.4)
MCV RBC AUTO: 91 FL (ref 82–98)
MONOCYTES # BLD AUTO: 0.45 THOUSAND/ΜL (ref 0.17–1.22)
MONOCYTES NFR BLD AUTO: 9 % (ref 4–12)
NEUTROPHILS # BLD AUTO: 3.32 THOUSANDS/ΜL (ref 1.85–7.62)
NEUTS SEG NFR BLD AUTO: 65 % (ref 43–75)
NITRITE UR QL STRIP: NEGATIVE
NRBC BLD AUTO-RTO: 0 /100 WBCS
PH UR STRIP.AUTO: 6.5 [PH]
PLATELET # BLD AUTO: 265 THOUSANDS/UL (ref 149–390)
PMV BLD AUTO: 10.4 FL (ref 8.9–12.7)
PROT UR STRIP-MCNC: NEGATIVE MG/DL
PROTHROMBIN TIME: 13.2 SECONDS (ref 11.6–14.5)
RBC # BLD AUTO: 4.7 MILLION/UL (ref 3.81–5.12)
RH BLD: POSITIVE
SP GR UR STRIP.AUTO: 1.01 (ref 1–1.03)
SPECIMEN EXPIRATION DATE: NORMAL
UROBILINOGEN UR QL STRIP.AUTO: 0.2 E.U./DL
WBC # BLD AUTO: 5.04 THOUSAND/UL (ref 4.31–10.16)

## 2020-10-19 PROCEDURE — U0003 INFECTIOUS AGENT DETECTION BY NUCLEIC ACID (DNA OR RNA); SEVERE ACUTE RESPIRATORY SYNDROME CORONAVIRUS 2 (SARS-COV-2) (CORONAVIRUS DISEASE [COVID-19]), AMPLIFIED PROBE TECHNIQUE, MAKING USE OF HIGH THROUGHPUT TECHNOLOGIES AS DESCRIBED BY CMS-2020-01-R: HCPCS | Performed by: NEUROLOGICAL SURGERY

## 2020-10-19 PROCEDURE — 83036 HEMOGLOBIN GLYCOSYLATED A1C: CPT

## 2020-10-19 PROCEDURE — 85025 COMPLETE CBC W/AUTO DIFF WBC: CPT

## 2020-10-19 PROCEDURE — 86850 RBC ANTIBODY SCREEN: CPT

## 2020-10-19 PROCEDURE — 36415 COLL VENOUS BLD VENIPUNCTURE: CPT

## 2020-10-19 PROCEDURE — 93005 ELECTROCARDIOGRAM TRACING: CPT

## 2020-10-19 PROCEDURE — 85730 THROMBOPLASTIN TIME PARTIAL: CPT

## 2020-10-19 PROCEDURE — 81003 URINALYSIS AUTO W/O SCOPE: CPT | Performed by: NEUROLOGICAL SURGERY

## 2020-10-19 PROCEDURE — 85610 PROTHROMBIN TIME: CPT

## 2020-10-19 PROCEDURE — 86900 BLOOD TYPING SEROLOGIC ABO: CPT

## 2020-10-19 PROCEDURE — 86901 BLOOD TYPING SEROLOGIC RH(D): CPT

## 2020-10-19 PROCEDURE — 87081 CULTURE SCREEN ONLY: CPT

## 2020-10-20 LAB
ATRIAL RATE: 61 BPM
MRSA NOSE QL CULT: NORMAL
PR INTERVAL: 166 MS
QRS AXIS: 4 DEGREES
QRSD INTERVAL: 106 MS
QT INTERVAL: 456 MS
QTC INTERVAL: 459 MS
SARS-COV-2 RNA SPEC QL NAA+PROBE: NOT DETECTED
T WAVE AXIS: 47 DEGREES
VENTRICULAR RATE: 61 BPM

## 2020-10-20 PROCEDURE — 93010 ELECTROCARDIOGRAM REPORT: CPT | Performed by: INTERNAL MEDICINE

## 2020-10-27 ENCOUNTER — ANESTHESIA EVENT (OUTPATIENT)
Dept: PERIOP | Facility: HOSPITAL | Age: 60
DRG: 516 | End: 2020-10-27
Payer: COMMERCIAL

## 2020-10-27 ENCOUNTER — DOCUMENTATION (OUTPATIENT)
Dept: NEUROSURGERY | Facility: CLINIC | Age: 60
End: 2020-10-27

## 2020-10-27 ENCOUNTER — TELEPHONE (OUTPATIENT)
Dept: PREADMISSION TESTING | Facility: HOSPITAL | Age: 60
End: 2020-10-27

## 2020-10-28 ENCOUNTER — ANESTHESIA (OUTPATIENT)
Dept: PERIOP | Facility: HOSPITAL | Age: 60
DRG: 516 | End: 2020-10-28
Payer: COMMERCIAL

## 2020-10-28 ENCOUNTER — HOSPITAL ENCOUNTER (INPATIENT)
Facility: HOSPITAL | Age: 60
LOS: 1 days | Discharge: HOME | DRG: 516 | End: 2020-10-29
Attending: NEUROLOGICAL SURGERY | Admitting: NEUROLOGICAL SURGERY
Payer: COMMERCIAL

## 2020-10-28 ENCOUNTER — HOSPITAL ENCOUNTER (OUTPATIENT)
Dept: RADIOLOGY | Facility: HOSPITAL | Age: 60
Setting detail: SURGERY ADMIT
Discharge: HOME | DRG: 516 | End: 2020-10-28
Payer: COMMERCIAL

## 2020-10-28 VITALS — HEART RATE: 81 BPM

## 2020-10-28 DIAGNOSIS — M48.061 SPINAL STENOSIS OF LUMBAR REGION, UNSPECIFIED WHETHER NEUROGENIC CLAUDICATION PRESENT: ICD-10-CM

## 2020-10-28 DIAGNOSIS — M43.10 SPONDYLOLISTHESIS: ICD-10-CM

## 2020-10-28 DIAGNOSIS — Z98.1 STATUS POST LUMBAR SPINAL FUSION: Primary | ICD-10-CM

## 2020-10-28 DIAGNOSIS — M43.10 SPONDYLOLISTHESIS, ACQUIRED: ICD-10-CM

## 2020-10-28 DIAGNOSIS — M54.16 LUMBAR RADICULOPATHY: ICD-10-CM

## 2020-10-28 DIAGNOSIS — M51.17 INTERVERTEBRAL DISC DISORDER WITH RADICULOPATHY OF LUMBOSACRAL REGION: ICD-10-CM

## 2020-10-28 LAB
ABO GROUP BLD: NORMAL
ANION GAP SERPL CALCULATED.3IONS-SCNC: 6 MMOL/L (ref 4–13)
BUN SERPL-MCNC: 9 MG/DL (ref 5–25)
CALCIUM SERPL-MCNC: 9.1 MG/DL (ref 8.3–10.1)
CHLORIDE SERPL-SCNC: 103 MMOL/L (ref 100–108)
CO2 SERPL-SCNC: 30 MMOL/L (ref 21–32)
CREAT SERPL-MCNC: 0.6 MG/DL (ref 0.6–1.3)
GFR SERPL CREATININE-BSD FRML MDRD: 100 ML/MIN/1.73SQ M
GLUCOSE P FAST SERPL-MCNC: 100 MG/DL (ref 65–99)
GLUCOSE SERPL-MCNC: 100 MG/DL (ref 65–140)
POTASSIUM SERPL-SCNC: 3.4 MMOL/L (ref 3.5–5.3)
RH BLD: POSITIVE
SODIUM SERPL-SCNC: 139 MMOL/L (ref 136–145)

## 2020-10-28 PROCEDURE — 63047 LAM FACETEC & FORAMOT LUMBAR: CPT | Performed by: NEUROLOGICAL SURGERY

## 2020-10-28 PROCEDURE — 01NR0ZZ RELEASE SACRAL NERVE, OPEN APPROACH: ICD-10-PCS | Performed by: NEUROLOGICAL SURGERY

## 2020-10-28 PROCEDURE — 22612 ARTHRD PST TQ 1NTRSPC LUMBAR: CPT | Performed by: NEUROLOGICAL SURGERY

## 2020-10-28 PROCEDURE — 0QH104Z INSERTION OF INTERNAL FIXATION DEVICE INTO SACRUM, OPEN APPROACH: ICD-10-PCS | Performed by: NEUROLOGICAL SURGERY

## 2020-10-28 PROCEDURE — 22614 ARTHRD PST TQ 1NTRSPC EA ADD: CPT | Performed by: NEUROLOGICAL SURGERY

## 2020-10-28 PROCEDURE — C1713 ANCHOR/SCREW BN/BN,TIS/BN: HCPCS | Performed by: NEUROLOGICAL SURGERY

## 2020-10-28 PROCEDURE — 80048 BASIC METABOLIC PNL TOTAL CA: CPT | Performed by: ANESTHESIOLOGY

## 2020-10-28 PROCEDURE — 01NB0ZZ RELEASE LUMBAR NERVE, OPEN APPROACH: ICD-10-PCS | Performed by: NEUROLOGICAL SURGERY

## 2020-10-28 PROCEDURE — 22840 INSERT SPINE FIXATION DEVICE: CPT | Performed by: NEUROLOGICAL SURGERY

## 2020-10-28 PROCEDURE — C1769 GUIDE WIRE: HCPCS | Performed by: NEUROLOGICAL SURGERY

## 2020-10-28 PROCEDURE — 72100 X-RAY EXAM L-S SPINE 2/3 VWS: CPT

## 2020-10-28 PROCEDURE — 0QH004Z INSERTION OF INTERNAL FIXATION DEVICE INTO LUMBAR VERTEBRA, OPEN APPROACH: ICD-10-PCS | Performed by: NEUROLOGICAL SURGERY

## 2020-10-28 DEVICE — SCREW SET 4.75MM SOLERA PERC: Type: IMPLANTABLE DEVICE | Site: SPINE LUMBAR | Status: FUNCTIONAL

## 2020-10-28 DEVICE — ROD 641003045 45MM CAPPED ROD 4.75MM CCM
Type: IMPLANTABLE DEVICE | Site: SPINE LUMBAR | Status: FUNCTIONAL
Brand: CD HORIZON® SOLERA® SPINAL SYSTEM

## 2020-10-28 RX ORDER — FENTANYL CITRATE/PF 50 MCG/ML
50 SYRINGE (ML) INJECTION
Status: DISCONTINUED | OUTPATIENT
Start: 2020-10-28 | End: 2020-10-28 | Stop reason: HOSPADM

## 2020-10-28 RX ORDER — OXYCODONE HYDROCHLORIDE 10 MG/1
10 TABLET ORAL EVERY 4 HOURS PRN
Status: DISCONTINUED | OUTPATIENT
Start: 2020-10-28 | End: 2020-10-29 | Stop reason: HOSPADM

## 2020-10-28 RX ORDER — FENTANYL CITRATE 50 UG/ML
INJECTION, SOLUTION INTRAMUSCULAR; INTRAVENOUS AS NEEDED
Status: DISCONTINUED | OUTPATIENT
Start: 2020-10-28 | End: 2020-10-28

## 2020-10-28 RX ORDER — CEFAZOLIN SODIUM 1 G/50ML
1000 SOLUTION INTRAVENOUS EVERY 8 HOURS
Status: COMPLETED | OUTPATIENT
Start: 2020-10-29 | End: 2020-10-29

## 2020-10-28 RX ORDER — METHOCARBAMOL 750 MG/1
750 TABLET, FILM COATED ORAL 4 TIMES DAILY PRN
Status: DISCONTINUED | OUTPATIENT
Start: 2020-10-28 | End: 2020-10-29 | Stop reason: HOSPADM

## 2020-10-28 RX ORDER — LORATADINE 10 MG/1
10 TABLET ORAL DAILY
Status: DISCONTINUED | OUTPATIENT
Start: 2020-10-29 | End: 2020-10-29 | Stop reason: HOSPADM

## 2020-10-28 RX ORDER — HEPARIN SODIUM 5000 [USP'U]/ML
5000 INJECTION, SOLUTION INTRAVENOUS; SUBCUTANEOUS EVERY 8 HOURS SCHEDULED
Status: DISCONTINUED | OUTPATIENT
Start: 2020-10-28 | End: 2020-10-29 | Stop reason: HOSPADM

## 2020-10-28 RX ORDER — DULOXETIN HYDROCHLORIDE 30 MG/1
30 CAPSULE, DELAYED RELEASE ORAL 2 TIMES DAILY
Status: DISCONTINUED | OUTPATIENT
Start: 2020-10-28 | End: 2020-10-29 | Stop reason: HOSPADM

## 2020-10-28 RX ORDER — SENNOSIDES 8.6 MG
1 TABLET ORAL DAILY
Status: DISCONTINUED | OUTPATIENT
Start: 2020-10-29 | End: 2020-10-29 | Stop reason: HOSPADM

## 2020-10-28 RX ORDER — FERROUS SULFATE 325(65) MG
325 TABLET ORAL 2 TIMES DAILY WITH MEALS
Status: DISCONTINUED | OUTPATIENT
Start: 2020-10-29 | End: 2020-10-29 | Stop reason: HOSPADM

## 2020-10-28 RX ORDER — GABAPENTIN 300 MG/1
600 CAPSULE ORAL DAILY
Status: DISCONTINUED | OUTPATIENT
Start: 2020-10-29 | End: 2020-10-29 | Stop reason: HOSPADM

## 2020-10-28 RX ORDER — HYDROMORPHONE HCL/PF 1 MG/ML
0.2 SYRINGE (ML) INJECTION
Status: DISCONTINUED | OUTPATIENT
Start: 2020-10-28 | End: 2020-10-28 | Stop reason: HOSPADM

## 2020-10-28 RX ORDER — ONDANSETRON 2 MG/ML
INJECTION INTRAMUSCULAR; INTRAVENOUS AS NEEDED
Status: DISCONTINUED | OUTPATIENT
Start: 2020-10-28 | End: 2020-10-28

## 2020-10-28 RX ORDER — CALCIUM CARBONATE 200(500)MG
1000 TABLET,CHEWABLE ORAL DAILY PRN
Status: DISCONTINUED | OUTPATIENT
Start: 2020-10-28 | End: 2020-10-29 | Stop reason: HOSPADM

## 2020-10-28 RX ORDER — FLUTICASONE PROPIONATE 50 MCG
1 SPRAY, SUSPENSION (ML) NASAL 2 TIMES DAILY
Status: DISCONTINUED | OUTPATIENT
Start: 2020-10-28 | End: 2020-10-29 | Stop reason: HOSPADM

## 2020-10-28 RX ORDER — SODIUM CHLORIDE, SODIUM LACTATE, POTASSIUM CHLORIDE, CALCIUM CHLORIDE 600; 310; 30; 20 MG/100ML; MG/100ML; MG/100ML; MG/100ML
100 INJECTION, SOLUTION INTRAVENOUS CONTINUOUS
Status: DISCONTINUED | OUTPATIENT
Start: 2020-10-28 | End: 2020-10-29 | Stop reason: HOSPADM

## 2020-10-28 RX ORDER — PROPOFOL 10 MG/ML
INJECTION, EMULSION INTRAVENOUS CONTINUOUS PRN
Status: DISCONTINUED | OUTPATIENT
Start: 2020-10-28 | End: 2020-10-28

## 2020-10-28 RX ORDER — BUPIVACAINE HYDROCHLORIDE AND EPINEPHRINE 5; 5 MG/ML; UG/ML
INJECTION, SOLUTION EPIDURAL; INTRACAUDAL; PERINEURAL AS NEEDED
Status: DISCONTINUED | OUTPATIENT
Start: 2020-10-28 | End: 2020-10-28 | Stop reason: HOSPADM

## 2020-10-28 RX ORDER — LIDOCAINE HYDROCHLORIDE AND EPINEPHRINE 10; 10 MG/ML; UG/ML
INJECTION, SOLUTION INFILTRATION; PERINEURAL AS NEEDED
Status: DISCONTINUED | OUTPATIENT
Start: 2020-10-28 | End: 2020-10-28 | Stop reason: HOSPADM

## 2020-10-28 RX ORDER — DOCUSATE SODIUM 100 MG/1
100 CAPSULE, LIQUID FILLED ORAL 2 TIMES DAILY
Status: DISCONTINUED | OUTPATIENT
Start: 2020-10-28 | End: 2020-10-29 | Stop reason: HOSPADM

## 2020-10-28 RX ORDER — DEXAMETHASONE SODIUM PHOSPHATE 10 MG/ML
INJECTION, SOLUTION INTRAMUSCULAR; INTRAVENOUS AS NEEDED
Status: DISCONTINUED | OUTPATIENT
Start: 2020-10-28 | End: 2020-10-28

## 2020-10-28 RX ORDER — HYDROMORPHONE HCL/PF 1 MG/ML
0.5 SYRINGE (ML) INJECTION
Status: DISCONTINUED | OUTPATIENT
Start: 2020-10-28 | End: 2020-10-29 | Stop reason: HOSPADM

## 2020-10-28 RX ORDER — PROPOFOL 10 MG/ML
INJECTION, EMULSION INTRAVENOUS AS NEEDED
Status: DISCONTINUED | OUTPATIENT
Start: 2020-10-28 | End: 2020-10-28

## 2020-10-28 RX ORDER — LIDOCAINE HYDROCHLORIDE 10 MG/ML
0.5 INJECTION, SOLUTION EPIDURAL; INFILTRATION; INTRACAUDAL; PERINEURAL ONCE AS NEEDED
Status: COMPLETED | OUTPATIENT
Start: 2020-10-28 | End: 2020-10-28

## 2020-10-28 RX ORDER — ONDANSETRON 2 MG/ML
4 INJECTION INTRAMUSCULAR; INTRAVENOUS ONCE AS NEEDED
Status: DISCONTINUED | OUTPATIENT
Start: 2020-10-28 | End: 2020-10-28 | Stop reason: HOSPADM

## 2020-10-28 RX ORDER — CEFAZOLIN SODIUM 1 G/3ML
INJECTION, POWDER, FOR SOLUTION INTRAMUSCULAR; INTRAVENOUS AS NEEDED
Status: DISCONTINUED | OUTPATIENT
Start: 2020-10-28 | End: 2020-10-28

## 2020-10-28 RX ORDER — ROCURONIUM BROMIDE 10 MG/ML
INJECTION, SOLUTION INTRAVENOUS AS NEEDED
Status: DISCONTINUED | OUTPATIENT
Start: 2020-10-28 | End: 2020-10-28

## 2020-10-28 RX ORDER — NEOSTIGMINE METHYLSULFATE 1 MG/ML
INJECTION INTRAVENOUS AS NEEDED
Status: DISCONTINUED | OUTPATIENT
Start: 2020-10-28 | End: 2020-10-28

## 2020-10-28 RX ORDER — KETAMINE HYDROCHLORIDE 50 MG/ML
INJECTION, SOLUTION, CONCENTRATE INTRAMUSCULAR; INTRAVENOUS AS NEEDED
Status: DISCONTINUED | OUTPATIENT
Start: 2020-10-28 | End: 2020-10-28

## 2020-10-28 RX ORDER — GLYCOPYRROLATE 0.2 MG/ML
INJECTION INTRAMUSCULAR; INTRAVENOUS AS NEEDED
Status: DISCONTINUED | OUTPATIENT
Start: 2020-10-28 | End: 2020-10-28

## 2020-10-28 RX ORDER — AZELASTINE 1 MG/ML
1 SPRAY, METERED NASAL DAILY
Status: DISCONTINUED | OUTPATIENT
Start: 2020-10-29 | End: 2020-10-29 | Stop reason: HOSPADM

## 2020-10-28 RX ORDER — LIDOCAINE HYDROCHLORIDE 10 MG/ML
INJECTION, SOLUTION EPIDURAL; INFILTRATION; INTRACAUDAL; PERINEURAL AS NEEDED
Status: DISCONTINUED | OUTPATIENT
Start: 2020-10-28 | End: 2020-10-28

## 2020-10-28 RX ORDER — SUCCINYLCHOLINE/SOD CL,ISO/PF 100 MG/5ML
SYRINGE (ML) INTRAVENOUS AS NEEDED
Status: DISCONTINUED | OUTPATIENT
Start: 2020-10-28 | End: 2020-10-28

## 2020-10-28 RX ORDER — ALBUMIN, HUMAN INJ 5% 5 %
SOLUTION INTRAVENOUS CONTINUOUS PRN
Status: DISCONTINUED | OUTPATIENT
Start: 2020-10-28 | End: 2020-10-28

## 2020-10-28 RX ORDER — CHLORHEXIDINE GLUCONATE 0.12 MG/ML
15 RINSE ORAL ONCE
Status: COMPLETED | OUTPATIENT
Start: 2020-10-28 | End: 2020-10-28

## 2020-10-28 RX ORDER — MONTELUKAST SODIUM 4 MG/1
1 TABLET, CHEWABLE ORAL
Status: DISCONTINUED | OUTPATIENT
Start: 2020-10-28 | End: 2020-10-29 | Stop reason: HOSPADM

## 2020-10-28 RX ORDER — SCOLOPAMINE TRANSDERMAL SYSTEM 1 MG/1
1 PATCH, EXTENDED RELEASE TRANSDERMAL
Status: DISCONTINUED | OUTPATIENT
Start: 2020-10-28 | End: 2020-10-28 | Stop reason: HOSPADM

## 2020-10-28 RX ORDER — SODIUM CHLORIDE 9 MG/ML
INJECTION, SOLUTION INTRAVENOUS CONTINUOUS PRN
Status: DISCONTINUED | OUTPATIENT
Start: 2020-10-28 | End: 2020-10-28

## 2020-10-28 RX ORDER — SODIUM CHLORIDE, SODIUM LACTATE, POTASSIUM CHLORIDE, CALCIUM CHLORIDE 600; 310; 30; 20 MG/100ML; MG/100ML; MG/100ML; MG/100ML
125 INJECTION, SOLUTION INTRAVENOUS CONTINUOUS
Status: DISCONTINUED | OUTPATIENT
Start: 2020-10-28 | End: 2020-10-28

## 2020-10-28 RX ORDER — ALBUTEROL SULFATE 90 UG/1
1 AEROSOL, METERED RESPIRATORY (INHALATION) EVERY 6 HOURS PRN
Status: DISCONTINUED | OUTPATIENT
Start: 2020-10-28 | End: 2020-10-29 | Stop reason: HOSPADM

## 2020-10-28 RX ORDER — SODIUM CHLORIDE 9 MG/ML
125 INJECTION, SOLUTION INTRAVENOUS CONTINUOUS
Status: DISCONTINUED | OUTPATIENT
Start: 2020-10-28 | End: 2020-10-29 | Stop reason: HOSPADM

## 2020-10-28 RX ORDER — FAMOTIDINE 20 MG/1
40 TABLET, FILM COATED ORAL EVERY 12 HOURS
Status: DISCONTINUED | OUTPATIENT
Start: 2020-10-28 | End: 2020-10-29 | Stop reason: HOSPADM

## 2020-10-28 RX ORDER — HYDROMORPHONE HCL/PF 1 MG/ML
SYRINGE (ML) INJECTION AS NEEDED
Status: DISCONTINUED | OUTPATIENT
Start: 2020-10-28 | End: 2020-10-28

## 2020-10-28 RX ORDER — OXYCODONE HYDROCHLORIDE 5 MG/1
5 TABLET ORAL EVERY 4 HOURS PRN
Status: DISCONTINUED | OUTPATIENT
Start: 2020-10-28 | End: 2020-10-29 | Stop reason: HOSPADM

## 2020-10-28 RX ORDER — MIDAZOLAM HYDROCHLORIDE 2 MG/2ML
INJECTION, SOLUTION INTRAMUSCULAR; INTRAVENOUS AS NEEDED
Status: DISCONTINUED | OUTPATIENT
Start: 2020-10-28 | End: 2020-10-28

## 2020-10-28 RX ORDER — SODIUM CHLORIDE, SODIUM LACTATE, POTASSIUM CHLORIDE, CALCIUM CHLORIDE 600; 310; 30; 20 MG/100ML; MG/100ML; MG/100ML; MG/100ML
INJECTION, SOLUTION INTRAVENOUS CONTINUOUS PRN
Status: DISCONTINUED | OUTPATIENT
Start: 2020-10-28 | End: 2020-10-28

## 2020-10-28 RX ORDER — ONDANSETRON 2 MG/ML
4 INJECTION INTRAMUSCULAR; INTRAVENOUS EVERY 6 HOURS PRN
Status: DISCONTINUED | OUTPATIENT
Start: 2020-10-28 | End: 2020-10-29 | Stop reason: HOSPADM

## 2020-10-28 RX ORDER — FLUTICASONE FUROATE AND VILANTEROL 200; 25 UG/1; UG/1
1 POWDER RESPIRATORY (INHALATION)
Status: DISCONTINUED | OUTPATIENT
Start: 2020-10-29 | End: 2020-10-29 | Stop reason: HOSPADM

## 2020-10-28 RX ORDER — EPHEDRINE SULFATE 50 MG/ML
INJECTION INTRAVENOUS AS NEEDED
Status: DISCONTINUED | OUTPATIENT
Start: 2020-10-28 | End: 2020-10-28

## 2020-10-28 RX ORDER — SODIUM CHLORIDE 9 MG/ML
50 INJECTION, SOLUTION INTRAVENOUS CONTINUOUS
Status: DISCONTINUED | OUTPATIENT
Start: 2020-10-28 | End: 2020-10-28

## 2020-10-28 RX ORDER — ACETAMINOPHEN 325 MG/1
975 TABLET ORAL EVERY 8 HOURS SCHEDULED
Status: DISCONTINUED | OUTPATIENT
Start: 2020-10-28 | End: 2020-10-29 | Stop reason: HOSPADM

## 2020-10-28 RX ADMIN — CEFAZOLIN 3000 MG: 1 INJECTION, POWDER, FOR SOLUTION INTRAVENOUS at 17:08

## 2020-10-28 RX ADMIN — ROCURONIUM BROMIDE 50 MG: 10 INJECTION, SOLUTION INTRAVENOUS at 12:13

## 2020-10-28 RX ADMIN — CHLORHEXIDINE GLUCONATE 15 ML: 1.2 RINSE ORAL at 10:01

## 2020-10-28 RX ADMIN — NEOSTIGMINE METHYLSULFATE 3 MG: 1 INJECTION, SOLUTION INTRAVENOUS at 17:14

## 2020-10-28 RX ADMIN — ALBUMIN (HUMAN): 12.5 INJECTION, SOLUTION INTRAVENOUS at 13:20

## 2020-10-28 RX ADMIN — SODIUM CHLORIDE: 0.9 INJECTION, SOLUTION INTRAVENOUS at 12:59

## 2020-10-28 RX ADMIN — FENTANYL CITRATE 100 MCG: 50 INJECTION, SOLUTION INTRAMUSCULAR; INTRAVENOUS at 12:13

## 2020-10-28 RX ADMIN — PROPOFOL 100 MG: 10 INJECTION, EMULSION INTRAVENOUS at 12:59

## 2020-10-28 RX ADMIN — PHENYLEPHRINE HYDROCHLORIDE 50 MCG/MIN: 10 INJECTION INTRAVENOUS at 13:06

## 2020-10-28 RX ADMIN — GLYCOPYRROLATE 0.4 MG: 0.2 INJECTION, SOLUTION INTRAMUSCULAR; INTRAVENOUS at 17:14

## 2020-10-28 RX ADMIN — FENTANYL CITRATE 50 MCG: 50 INJECTION, SOLUTION INTRAMUSCULAR; INTRAVENOUS at 17:03

## 2020-10-28 RX ADMIN — DEXMEDETOMIDINE 0.2 MCG/KG/HR: 100 INJECTION, SOLUTION, CONCENTRATE INTRAVENOUS at 12:59

## 2020-10-28 RX ADMIN — DULOXETINE HYDROCHLORIDE 30 MG: 30 CAPSULE, DELAYED RELEASE ORAL at 22:02

## 2020-10-28 RX ADMIN — PROPOFOL 100 MCG/KG/MIN: 10 INJECTION, EMULSION INTRAVENOUS at 12:59

## 2020-10-28 RX ADMIN — METHOCARBAMOL TABLETS 750 MG: 750 TABLET, COATED ORAL at 22:01

## 2020-10-28 RX ADMIN — HYDROMORPHONE HYDROCHLORIDE 0.5 MG: 1 INJECTION, SOLUTION INTRAMUSCULAR; INTRAVENOUS; SUBCUTANEOUS at 14:10

## 2020-10-28 RX ADMIN — DOCUSATE SODIUM 100 MG: 100 CAPSULE ORAL at 22:02

## 2020-10-28 RX ADMIN — PROPOFOL 150 MG: 10 INJECTION, EMULSION INTRAVENOUS at 12:13

## 2020-10-28 RX ADMIN — EPHEDRINE SULFATE 10 MG: 50 INJECTION, SOLUTION INTRAVENOUS at 13:09

## 2020-10-28 RX ADMIN — GLYCOPYRROLATE 0.2 MG: 0.2 INJECTION, SOLUTION INTRAMUSCULAR; INTRAVENOUS at 12:26

## 2020-10-28 RX ADMIN — HEPARIN SODIUM 5000 UNITS: 5000 INJECTION INTRAVENOUS; SUBCUTANEOUS at 22:02

## 2020-10-28 RX ADMIN — SODIUM CHLORIDE, SODIUM LACTATE, POTASSIUM CHLORIDE, AND CALCIUM CHLORIDE: .6; .31; .03; .02 INJECTION, SOLUTION INTRAVENOUS at 12:10

## 2020-10-28 RX ADMIN — KETAMINE HYDROCHLORIDE 30 MG: 50 INJECTION, SOLUTION INTRAMUSCULAR; INTRAVENOUS at 12:13

## 2020-10-28 RX ADMIN — MIDAZOLAM 2 MG: 1 INJECTION INTRAMUSCULAR; INTRAVENOUS at 12:09

## 2020-10-28 RX ADMIN — LIDOCAINE HYDROCHLORIDE 0.5 ML: 10 INJECTION, SOLUTION EPIDURAL; INFILTRATION; INTRACAUDAL; PERINEURAL at 10:36

## 2020-10-28 RX ADMIN — FAMOTIDINE 40 MG: 20 TABLET ORAL at 22:01

## 2020-10-28 RX ADMIN — DEXAMETHASONE SODIUM PHOSPHATE 10 MG: 10 INJECTION, SOLUTION INTRAMUSCULAR; INTRAVENOUS at 12:24

## 2020-10-28 RX ADMIN — Medication 100 MG: at 12:13

## 2020-10-28 RX ADMIN — CEFAZOLIN 3000 MG: 1 INJECTION, POWDER, FOR SOLUTION INTRAVENOUS at 13:09

## 2020-10-28 RX ADMIN — ALBUMIN (HUMAN): 12.5 INJECTION, SOLUTION INTRAVENOUS at 13:08

## 2020-10-28 RX ADMIN — FLUTICASONE PROPIONATE 1 SPRAY: 50 SPRAY, METERED NASAL at 22:03

## 2020-10-28 RX ADMIN — Medication 50 MCG: at 18:03

## 2020-10-28 RX ADMIN — PHENYLEPHRINE HYDROCHLORIDE 200 MCG: 10 INJECTION INTRAVENOUS at 13:12

## 2020-10-28 RX ADMIN — PROPOFOL 50 MG: 10 INJECTION, EMULSION INTRAVENOUS at 12:25

## 2020-10-28 RX ADMIN — ONDANSETRON 4 MG: 2 INJECTION INTRAMUSCULAR; INTRAVENOUS at 17:14

## 2020-10-28 RX ADMIN — LIDOCAINE HYDROCHLORIDE 100 MG: 10 INJECTION, SOLUTION EPIDURAL; INFILTRATION; INTRACAUDAL; PERINEURAL at 12:13

## 2020-10-28 RX ADMIN — SODIUM CHLORIDE, SODIUM LACTATE, POTASSIUM CHLORIDE, AND CALCIUM CHLORIDE 125 ML/HR: .6; .31; .03; .02 INJECTION, SOLUTION INTRAVENOUS at 10:36

## 2020-10-28 RX ADMIN — REMIFENTANIL HYDROCHLORIDE 0.2 MCG/KG/MIN: 1 INJECTION, POWDER, LYOPHILIZED, FOR SOLUTION INTRAVENOUS at 12:59

## 2020-10-28 RX ADMIN — KETAMINE HYDROCHLORIDE 20 MG: 50 INJECTION, SOLUTION INTRAMUSCULAR; INTRAVENOUS at 13:31

## 2020-10-28 RX ADMIN — ACETAMINOPHEN 975 MG: 325 TABLET, FILM COATED ORAL at 22:00

## 2020-10-28 RX ADMIN — FENTANYL CITRATE 50 MCG: 50 INJECTION, SOLUTION INTRAMUSCULAR; INTRAVENOUS at 16:08

## 2020-10-29 ENCOUNTER — APPOINTMENT (INPATIENT)
Dept: RADIOLOGY | Facility: HOSPITAL | Age: 60
DRG: 516 | End: 2020-10-29
Payer: COMMERCIAL

## 2020-10-29 VITALS
RESPIRATION RATE: 18 BRPM | DIASTOLIC BLOOD PRESSURE: 74 MMHG | SYSTOLIC BLOOD PRESSURE: 134 MMHG | WEIGHT: 293 LBS | OXYGEN SATURATION: 94 % | HEIGHT: 69 IN | BODY MASS INDEX: 43.4 KG/M2 | TEMPERATURE: 97.8 F | HEART RATE: 74 BPM

## 2020-10-29 LAB
ANION GAP SERPL CALCULATED.3IONS-SCNC: 4 MMOL/L (ref 4–13)
BUN SERPL-MCNC: 7 MG/DL (ref 5–25)
CALCIUM SERPL-MCNC: 8.5 MG/DL (ref 8.3–10.1)
CHLORIDE SERPL-SCNC: 104 MMOL/L (ref 100–108)
CO2 SERPL-SCNC: 31 MMOL/L (ref 21–32)
CREAT SERPL-MCNC: 0.5 MG/DL (ref 0.6–1.3)
ERYTHROCYTE [DISTWIDTH] IN BLOOD BY AUTOMATED COUNT: 14.6 % (ref 11.6–15.1)
GFR SERPL CREATININE-BSD FRML MDRD: 106 ML/MIN/1.73SQ M
GLUCOSE SERPL-MCNC: 92 MG/DL (ref 65–140)
HCT VFR BLD AUTO: 36.6 % (ref 34.8–46.1)
HGB BLD-MCNC: 12.1 G/DL (ref 11.5–15.4)
MCH RBC QN AUTO: 30.9 PG (ref 26.8–34.3)
MCHC RBC AUTO-ENTMCNC: 33.1 G/DL (ref 31.4–37.4)
MCV RBC AUTO: 93 FL (ref 82–98)
PLATELET # BLD AUTO: 260 THOUSANDS/UL (ref 149–390)
PMV BLD AUTO: 10.4 FL (ref 8.9–12.7)
POTASSIUM SERPL-SCNC: 3.5 MMOL/L (ref 3.5–5.3)
RBC # BLD AUTO: 3.92 MILLION/UL (ref 3.81–5.12)
SODIUM SERPL-SCNC: 139 MMOL/L (ref 136–145)
WBC # BLD AUTO: 9.44 THOUSAND/UL (ref 4.31–10.16)

## 2020-10-29 PROCEDURE — 85027 COMPLETE CBC AUTOMATED: CPT | Performed by: PHYSICIAN ASSISTANT

## 2020-10-29 PROCEDURE — 72100 X-RAY EXAM L-S SPINE 2/3 VWS: CPT

## 2020-10-29 PROCEDURE — 97163 PT EVAL HIGH COMPLEX 45 MIN: CPT

## 2020-10-29 PROCEDURE — 80048 BASIC METABOLIC PNL TOTAL CA: CPT | Performed by: PHYSICIAN ASSISTANT

## 2020-10-29 PROCEDURE — 97166 OT EVAL MOD COMPLEX 45 MIN: CPT

## 2020-10-29 PROCEDURE — 99024 POSTOP FOLLOW-UP VISIT: CPT | Performed by: PHYSICIAN ASSISTANT

## 2020-10-29 RX ORDER — OXYCODONE HYDROCHLORIDE 5 MG/1
5 TABLET ORAL EVERY 4 HOURS PRN
Qty: 30 TABLET | Refills: 0 | Status: SHIPPED | OUTPATIENT
Start: 2020-10-29 | End: 2020-11-08

## 2020-10-29 RX ORDER — METHOCARBAMOL 750 MG/1
750 TABLET, FILM COATED ORAL 3 TIMES DAILY PRN
Qty: 30 TABLET | Refills: 0 | Status: SHIPPED | OUTPATIENT
Start: 2020-10-29 | End: 2020-12-10

## 2020-10-29 RX ADMIN — FAMOTIDINE 40 MG: 20 TABLET ORAL at 05:16

## 2020-10-29 RX ADMIN — SENNOSIDES 8.6 MG: 8.6 TABLET, FILM COATED ORAL at 08:39

## 2020-10-29 RX ADMIN — DULOXETINE HYDROCHLORIDE 30 MG: 30 CAPSULE, DELAYED RELEASE ORAL at 08:39

## 2020-10-29 RX ADMIN — FERROUS SULFATE TAB 325 MG (65 MG ELEMENTAL FE) 325 MG: 325 (65 FE) TAB at 08:40

## 2020-10-29 RX ADMIN — SODIUM CHLORIDE, SODIUM LACTATE, POTASSIUM CHLORIDE, AND CALCIUM CHLORIDE 100 ML/HR: .6; .31; .03; .02 INJECTION, SOLUTION INTRAVENOUS at 00:59

## 2020-10-29 RX ADMIN — CEFAZOLIN SODIUM 1000 MG: 1 SOLUTION INTRAVENOUS at 08:40

## 2020-10-29 RX ADMIN — METHOCARBAMOL TABLETS 750 MG: 750 TABLET, COATED ORAL at 05:15

## 2020-10-29 RX ADMIN — METHOCARBAMOL TABLETS 750 MG: 750 TABLET, COATED ORAL at 10:33

## 2020-10-29 RX ADMIN — CEFAZOLIN SODIUM 1000 MG: 1 SOLUTION INTRAVENOUS at 01:59

## 2020-10-29 RX ADMIN — FLUTICASONE FUROATE AND VILANTEROL TRIFENATATE 1 PUFF: 200; 25 POWDER RESPIRATORY (INHALATION) at 08:41

## 2020-10-29 RX ADMIN — DOCUSATE SODIUM 100 MG: 100 CAPSULE ORAL at 08:39

## 2020-10-29 RX ADMIN — HEPARIN SODIUM 5000 UNITS: 5000 INJECTION INTRAVENOUS; SUBCUTANEOUS at 05:16

## 2020-10-29 RX ADMIN — LORATADINE 10 MG: 10 TABLET ORAL at 08:40

## 2020-10-29 RX ADMIN — LOSARTAN POTASSIUM: 50 TABLET, FILM COATED ORAL at 08:40

## 2020-10-29 RX ADMIN — GABAPENTIN 600 MG: 300 CAPSULE ORAL at 08:39

## 2020-10-29 RX ADMIN — OXYCODONE HYDROCHLORIDE 5 MG: 5 TABLET ORAL at 10:33

## 2020-10-29 RX ADMIN — FLUTICASONE PROPIONATE 1 SPRAY: 50 SPRAY, METERED NASAL at 08:41

## 2020-10-29 RX ADMIN — ACETAMINOPHEN 975 MG: 325 TABLET, FILM COATED ORAL at 05:15

## 2020-10-29 RX ADMIN — ACETAMINOPHEN 975 MG: 325 TABLET, FILM COATED ORAL at 13:56

## 2020-10-29 RX ADMIN — AZELASTINE HYDROCHLORIDE 1 SPRAY: 137 SPRAY, METERED NASAL at 08:41

## 2020-10-30 ENCOUNTER — TELEMEDICINE (OUTPATIENT)
Dept: NEUROSURGERY | Facility: CLINIC | Age: 60
End: 2020-10-30

## 2020-10-30 DIAGNOSIS — Z48.89 AFTERCARE FOLLOWING SURGERY FOR INJURY AND TRAUMA: Primary | ICD-10-CM

## 2020-10-30 PROCEDURE — 99024 POSTOP FOLLOW-UP VISIT: CPT

## 2020-11-09 ENCOUNTER — TELEPHONE (OUTPATIENT)
Dept: NEUROSURGERY | Facility: CLINIC | Age: 60
End: 2020-11-09

## 2020-11-11 ENCOUNTER — CLINICAL SUPPORT (OUTPATIENT)
Dept: NEUROSURGERY | Facility: CLINIC | Age: 60
End: 2020-11-11

## 2020-11-11 VITALS — TEMPERATURE: 96.7 F | DIASTOLIC BLOOD PRESSURE: 78 MMHG | SYSTOLIC BLOOD PRESSURE: 144 MMHG

## 2020-11-11 DIAGNOSIS — Z98.890 POST-OPERATIVE STATE: Primary | ICD-10-CM

## 2020-11-11 PROCEDURE — 99024 POSTOP FOLLOW-UP VISIT: CPT

## 2020-12-07 ENCOUNTER — HOSPITAL ENCOUNTER (OUTPATIENT)
Dept: RADIOLOGY | Facility: HOSPITAL | Age: 60
Discharge: HOME | End: 2020-12-07
Payer: COMMERCIAL

## 2020-12-07 DIAGNOSIS — M48.061 SPINAL STENOSIS OF LUMBAR REGION, UNSPECIFIED WHETHER NEUROGENIC CLAUDICATION PRESENT: ICD-10-CM

## 2020-12-07 DIAGNOSIS — Z98.1 STATUS POST LUMBAR SPINAL FUSION: ICD-10-CM

## 2020-12-07 DIAGNOSIS — M43.10 SPONDYLOLISTHESIS, ACQUIRED: ICD-10-CM

## 2020-12-07 DIAGNOSIS — M54.16 LUMBAR RADICULOPATHY: ICD-10-CM

## 2020-12-07 DIAGNOSIS — M51.17 INTERVERTEBRAL DISC DISORDER WITH RADICULOPATHY OF LUMBOSACRAL REGION: ICD-10-CM

## 2020-12-07 PROCEDURE — 72100 X-RAY EXAM L-S SPINE 2/3 VWS: CPT

## 2020-12-10 ENCOUNTER — OFFICE VISIT (OUTPATIENT)
Dept: NEUROSURGERY | Facility: CLINIC | Age: 60
End: 2020-12-10

## 2020-12-10 VITALS
WEIGHT: 293 LBS | TEMPERATURE: 96.9 F | HEART RATE: 73 BPM | RESPIRATION RATE: 16 BRPM | DIASTOLIC BLOOD PRESSURE: 61 MMHG | BODY MASS INDEX: 43.4 KG/M2 | HEIGHT: 69 IN | SYSTOLIC BLOOD PRESSURE: 138 MMHG

## 2020-12-10 DIAGNOSIS — Z98.1 ARTHRODESIS STATUS: Primary | ICD-10-CM

## 2020-12-10 PROCEDURE — 99024 POSTOP FOLLOW-UP VISIT: CPT | Performed by: NEUROLOGICAL SURGERY

## 2021-03-04 ENCOUNTER — HOSPITAL ENCOUNTER (OUTPATIENT)
Dept: RADIOLOGY | Facility: HOSPITAL | Age: 61
Discharge: HOME | End: 2021-03-04
Attending: NEUROLOGICAL SURGERY
Payer: COMMERCIAL

## 2021-03-04 DIAGNOSIS — Z98.1 ARTHRODESIS STATUS: ICD-10-CM

## 2021-03-04 PROCEDURE — 72120 X-RAY BEND ONLY L-S SPINE: CPT

## 2021-03-10 NOTE — PROGRESS NOTES
DISCUSSION SUMMARY  This is a 62 y o  female Status post a L4-S1 fixation fusion  She is improving from a functional standpoint  She is walking better than she did preoperatively and overall is happy with her  Postop of result  X-rays demonstrated a fracture of 1 of the pedicle screws  Detailed follow-up imaging is important  I would recommend against any intervention currently however if she worsens then replacing the screw was necessary  Return in about 3 months (around 6/11/2021) for follow-up after test       Diagnosis ICD-10-CM Associated Orders   1  Encounter for postoperative care related to surgical joint fusion  Z48 89 XR spine lumbar complete w bending minimum 6 views    Z98 1           Reason for Visit: 3 month f/u with XR Lspine (bending) 3/4/21 after PT    HPI: This patient presented to our office with severe low back pain inability to walk significant distances  MRI of the lumbosacral spine was carefully reviewed  This demonstrated a grade 2-3 spondylolisthesis of L5 on S1  In addition to this there was spinal stenosis at the L5-S1 region  On 10/28/20, patient underwent an "L4 and S1 minimally invasive lumbar  fusion and L5 decompressive hemilaminectomy and bilateral foraminotomy" (DKO)  Patient was last seen on 12/10/20 and was doing very well  Her pain was improving  She still had some ambulatory deficit caused by her left knee arthritic change  I recommended physical therapy for her  Long-term follow-up would be essential for her  She reports some right hip pain  She denies having fallen or been involved in any accidents  She believes that she is improving overall with the ability to walk much longer distances then she had this previously  Specifically she was not able to walk to her friend's house before the procedure and she is able to do so now  She does have pain in her right hip early in the morning but as the day goes on this improves    She is able to stretch and alleviate any pain that she does develop  REVIEW OF SYSTEMS  Review of Systems   Constitutional: Positive for activity change (completed PT)  HENT: Negative  Eyes: Negative  Respiratory: Negative  Cardiovascular: Negative  Gastrointestinal: Negative  Endocrine: Negative  Genitourinary: Negative  Musculoskeletal: Positive for arthralgias (right hip mainly and buttocks which per patient is related to her knee) and gait problem (due to her right knee problem)  Skin: Negative  Allergic/Immunologic: Negative  Hematological: Negative  Psychiatric/Behavioral: Negative  All other systems reviewed and are negative  I reviewed the ROS      Vitals:    /82   Pulse 86   Temp 98 3 °F (36 8 °C) (Tympanic)   Resp 18   Ht 5' 9" (1 753 m)   Wt 124 kg (274 lb)   BMI 40 46 kg/m²       MEDICAL HISTORY  Past Medical History:   Diagnosis Date    Asthma     CPAP (continuous positive airway pressure) dependence     History of spinal cord injury     Hypertension     Low back pain     PONV (postoperative nausea and vomiting)     Sleep apnea      Past Surgical History:   Procedure Laterality Date    BOWEL RESECTION      CERVICAL DISC ARTHROPLASTY      CERVICAL FUSION      CHOLECYSTECTOMY      PARATHYROIDECTOMY      TX ARTHDSIS POST/POSTEROLATRL/POSTINTERBODY LUMBAR Left 10/28/2020    Procedure: L4 and S1 minimally invasive lumbar  fusion and L5 decompressive hemilaminectomy and bilateral foraminotomy;  Surgeon: Alejandra Lacey MD;  [facility];  Service: Neurosurgery    SALPINGOOPHORECTOMY Left     SINUS SURGERY      VASCULAR SURGERY      ablation      Social History     Tobacco Use    Smoking status: Former Smoker    Smokeless tobacco: Never Used    Tobacco comment: quit > 30 years    Substance Use Topics    Alcohol use: Yes     Frequency: Monthly or less    Drug use: Never        Current Outpatient Medications:     albuterol (PROVENTIL HFA,VENTOLIN HFA) 90 mcg/act inhaler, Inhale 1 puff as needed , Disp: , Rfl:     azelastine (ASTELIN) 0 1 % nasal spray, 1 spray into each nostril daily , Disp: , Rfl: 11    Calcium Carbonate-Vitamin D (CALCIUM 500 + D PO), Take 2 tablets by mouth daily, Disp: , Rfl:     Calcium Polycarbophil (FIBER-CAPS PO), Take by mouth daily, Disp: , Rfl:     Cetirizine HCl (ZYRTEC PO), Take by mouth daily, Disp: , Rfl:     clonazePAM (KlonoPIN) 0 125 mg disintegrating tablet, Take by mouth daily at bedtime , Disp: , Rfl:     colestipol (COLESTID) 1 g tablet, Take 1 g by mouth daily at bedtime , Disp: , Rfl:     DULoxetine (CYMBALTA) 30 mg delayed release capsule, Take 30 mg by mouth 2 (two) times a day , Disp: , Rfl:     famotidine (PEPCID) 40 MG tablet, Take 40 mg by mouth every 12 (twelve) hours, Disp: , Rfl:     ferrous sulfate 325 (65 Fe) mg tablet, Take 325 mg by mouth 2 (two) times a day with meals, Disp: , Rfl:     gabapentin (NEURONTIN) 600 MG tablet, Take 600 mg by mouth daily, Disp: , Rfl:     Glucosamine-Chondroit-Vit C-Mn (GLUCOSAMINE CHONDR 500 COMPLEX PO), Take 2 tablets by mouth daily, Disp: , Rfl:     losartan-hydrochlorothiazide (HYZAAR) 50-12 5 mg per tablet, Take 1 tablet by mouth daily, Disp: , Rfl:     mometasone (NASONEX) 50 mcg/act nasal spray, 1 spray into each nostril daily , Disp: , Rfl:     Multiple Vitamins-Minerals (CENTRUM SILVER PO), Take by mouth daily, Disp: , Rfl:      Allergies   Allergen Reactions    Lisinopril Cough        The following portions of the patient's history were updated by MA and reviewed by MD: allergies, current medications, past family history, past medical history, past social history, past surgical history and problem list       Physical Exam    Awake alert and oriented   her station and gait are near normal except for limping with her left knee  She is in need of a left knee replacement  She has been losing weight and is nearly ready to have her knee replaced      She has some pain to palpation over the SI joint on the right side  She has no pain to palpation over the instrumentation  Her power in her lower extremities is 5/5  RESULTS/DATA  I have personally reviewed pertinent films in PACS       X-rays of the LS spine demonstrated fracture of 1 of the screws  The alignment is maintained  There is no loosening of the rest of the instrumentation

## 2021-03-11 ENCOUNTER — OFFICE VISIT (OUTPATIENT)
Dept: NEUROSURGERY | Facility: CLINIC | Age: 61
End: 2021-03-11
Payer: COMMERCIAL

## 2021-03-11 VITALS
SYSTOLIC BLOOD PRESSURE: 134 MMHG | TEMPERATURE: 98.3 F | HEART RATE: 86 BPM | HEIGHT: 69 IN | RESPIRATION RATE: 18 BRPM | WEIGHT: 274 LBS | DIASTOLIC BLOOD PRESSURE: 82 MMHG | BODY MASS INDEX: 40.58 KG/M2

## 2021-03-11 DIAGNOSIS — Z48.89 ENCOUNTER FOR POSTOPERATIVE CARE RELATED TO SURGICAL JOINT FUSION: Primary | ICD-10-CM

## 2021-03-11 DIAGNOSIS — Z98.1 ENCOUNTER FOR POSTOPERATIVE CARE RELATED TO SURGICAL JOINT FUSION: Primary | ICD-10-CM

## 2021-03-11 PROCEDURE — 99213 OFFICE O/P EST LOW 20 MIN: CPT | Performed by: NEUROLOGICAL SURGERY

## 2021-05-06 ENCOUNTER — TELEPHONE (OUTPATIENT)
Dept: NEUROSURGERY | Facility: CLINIC | Age: 61
End: 2021-05-06

## 2021-05-06 NOTE — TELEPHONE ENCOUNTER
Patient called nurseline stating Dr Sari Watt mentioned in her office visit in march that patient is unable to take NSAIDs d/t screw being broken and unsure how the fusion has healed  Returned call to patient and advised will route to Dr Sari Watt for his recommendations on whether or not patient can take ASA and NSAIDs for her upcoming knee replacement  Patient appreciative of call back

## 2021-05-06 NOTE — TELEPHONE ENCOUNTER
Patient called nurseline stating she is having a knee replacement on 5/19 and is wondering if she is still only able to take tylenol since she has had surgery in 10/2020  Returned call to patient and left a voicemail explaining typically patient should avoid NSAIDs for about 3 months post fusion  Advised patient is now almost 7 months post spine surgery so taking pain meds for her upcoming knee replacement would be appropriate  Provided office number should patient have any other questions or concerns

## 2021-05-11 NOTE — TELEPHONE ENCOUNTER
Patient called nurseline stating she has not heard anything about whether she is able to take ASA or NSAIDs prior to her upcoming knee replacement on 5/19 and is following up  Will route to Dr Adria Shepard

## 2021-05-14 NOTE — TELEPHONE ENCOUNTER
Reached out to patient and advised she may take ASA/NSAIDs per Dr James Dominique if needed with her upcoming knee replacement  Patient appreciative of call back

## 2021-06-29 ENCOUNTER — HOSPITAL ENCOUNTER (OUTPATIENT)
Dept: RADIOLOGY | Facility: HOSPITAL | Age: 61
Discharge: HOME | End: 2021-06-29
Attending: NEUROLOGICAL SURGERY
Payer: COMMERCIAL

## 2021-06-29 DIAGNOSIS — Z48.89 ENCOUNTER FOR POSTOPERATIVE CARE RELATED TO SURGICAL JOINT FUSION: ICD-10-CM

## 2021-06-29 DIAGNOSIS — Z98.1 ENCOUNTER FOR POSTOPERATIVE CARE RELATED TO SURGICAL JOINT FUSION: ICD-10-CM

## 2021-06-29 PROCEDURE — 72114 X-RAY EXAM L-S SPINE BENDING: CPT

## 2021-07-01 ENCOUNTER — OFFICE VISIT (OUTPATIENT)
Dept: NEUROSURGERY | Facility: CLINIC | Age: 61
End: 2021-07-01
Payer: COMMERCIAL

## 2021-07-01 VITALS
SYSTOLIC BLOOD PRESSURE: 116 MMHG | WEIGHT: 248 LBS | BODY MASS INDEX: 36.73 KG/M2 | HEIGHT: 69 IN | HEART RATE: 78 BPM | DIASTOLIC BLOOD PRESSURE: 62 MMHG | TEMPERATURE: 98.1 F | RESPIRATION RATE: 16 BRPM

## 2021-07-01 DIAGNOSIS — Z98.1 ENCOUNTER FOR POSTOPERATIVE CARE RELATED TO SURGICAL JOINT FUSION: Primary | ICD-10-CM

## 2021-07-01 DIAGNOSIS — Z48.89 ENCOUNTER FOR POSTOPERATIVE CARE RELATED TO SURGICAL JOINT FUSION: Primary | ICD-10-CM

## 2021-07-01 PROCEDURE — 99212 OFFICE O/P EST SF 10 MIN: CPT | Performed by: NEUROLOGICAL SURGERY

## 2021-07-01 NOTE — PROGRESS NOTES
DISCUSSION SUMMARY  This is a 62 y o  female who is much improved  She is standing physical therapy and has increased her walking from very little to now 1 18 miles which is her personal record  She has no pain at baseline but becomes concerned whenever she moves and has even minimal pain  I attempted to reassure her that small amounts pain are to be expected  The fractured screw is unchanged in flexion and extension on the current studies  I recommended continued use of the bone stimulator and will see her virtually in 6 months time after performance of another set of x-rays to follow this along  Return in about 6 months (around 1/1/2022) for Virtual follow-up after performance of images  Diagnosis ICD-10-CM Associated Orders   1  Encounter for postoperative care related to surgical joint fusion  Z48 89 XR spine lumbar complete w bending minimum 6 views    Z98 1           Chief Complaint   Patient presents with    Follow-up     3 month f/u with Lspine x-rays (bending) 6/29/21 s/p L4 and S1 minimally invasive lumbar  fusion and L5 decompressive hemilaminectomy and bilateral foraminotomy on 10/28/20 (DKO)       This patient is status post an L4-S1 fixation fusion  She has improved from a functional standpoint  She reports that her walking is better with PT which she continues to do 2x/week  Post-op x-rays reported a fractured screw which we are following  She reports no back pain but does have discomfort which she is unsure to relate it to the fractured screw or PT  She had some left knee issues which she underwent a knee replacement recently and has been improving in the perspective  She had some minor complaints of rare and transient pain  Whenever she feels this pain she is concerned that her pain may be coming back  She ambulated a total of 1 1 a miles which is a personal record for her  She is very pleased in proud of this result  She is very thankful    She did undergo a left knee surgery 6 weeks ago and has improved her station and gait  She continues to participate in physical therapy and has been making progress in this endeavor  Ambulated 1 18 miles which is a triumph for her      Review of Systems   Constitutional: Positive for activity change (PT 2x/week)  HENT: Negative  Eyes: Negative  Respiratory: Negative  Cardiovascular: Negative  Gastrointestinal: Negative  Endocrine: Negative  Genitourinary: Negative  Musculoskeletal: Positive for arthralgias and gait problem (left knee sx 6 wks ago)  Negative for back pain (more like discomfort in the buttocks)  Currently in PT for left knee   Skin: Negative  Allergic/Immunologic: Negative  Neurological: Positive for weakness (right leg; unchanged)  Hematological: Negative  Psychiatric/Behavioral: Negative  All other systems reviewed and are negative  I reviewed the ROS      Vitals:    /62 (BP Location: Right arm, Patient Position: Sitting)   Pulse 78   Temp 98 1 °F (36 7 °C) (Tympanic)   Resp 16   Ht 5' 9" (1 753 m)   Wt 112 kg (248 lb)   BMI 36 62 kg/m²       MEDICAL HISTORY  Past Medical History:   Diagnosis Date    Asthma     CPAP (continuous positive airway pressure) dependence     History of spinal cord injury     Hypertension     Low back pain     PONV (postoperative nausea and vomiting)     Sleep apnea      Past Surgical History:   Procedure Laterality Date    BOWEL RESECTION      CERVICAL DISC ARTHROPLASTY      CERVICAL FUSION      CHOLECYSTECTOMY      PARATHYROIDECTOMY      AR ARTHDSIS POST/POSTEROLATRL/POSTINTERBODY LUMBAR Left 10/28/2020    Procedure: L4 and S1 minimally invasive lumbar  fusion and L5 decompressive hemilaminectomy and bilateral foraminotomy;  Surgeon: Amy Queen MD;  [facility];  Service: Neurosurgery    SALPINGOOPHORECTOMY Left     SINUS SURGERY      VASCULAR SURGERY      ablation      Social History     Tobacco Use    Smoking status: Former Smoker    Smokeless tobacco: Never Used    Tobacco comment: quit > 30 years    Substance Use Topics    Alcohol use:  Yes    Drug use: Never        Current Outpatient Medications:     albuterol (PROVENTIL HFA,VENTOLIN HFA) 90 mcg/act inhaler, Inhale 1 puff as needed , Disp: , Rfl:     azelastine (ASTELIN) 0 1 % nasal spray, 1 spray into each nostril daily , Disp: , Rfl: 11    Calcium Carbonate-Vitamin D (CALCIUM 500 + D PO), Take 2 tablets by mouth daily, Disp: , Rfl:     Cetirizine HCl (ZYRTEC PO), Take by mouth daily, Disp: , Rfl:     clonazePAM (KlonoPIN) 0 125 mg disintegrating tablet, Take by mouth daily at bedtime , Disp: , Rfl:     colestipol (COLESTID) 1 g tablet, Take 1 g by mouth daily at bedtime , Disp: , Rfl:     DULoxetine (CYMBALTA) 30 mg delayed release capsule, Take 30 mg by mouth 2 (two) times a day , Disp: , Rfl:     famotidine (PEPCID) 40 MG tablet, Take 40 mg by mouth every 12 (twelve) hours, Disp: , Rfl:     ferrous sulfate 325 (65 Fe) mg tablet, Take 325 mg by mouth 2 (two) times a day with meals, Disp: , Rfl:     gabapentin (NEURONTIN) 600 MG tablet, Take 600 mg by mouth daily, Disp: , Rfl:     Glucosamine-Chondroit-Vit C-Mn (GLUCOSAMINE CHONDR 500 COMPLEX PO), Take 2 tablets by mouth daily, Disp: , Rfl:     losartan-hydrochlorothiazide (HYZAAR) 50-12 5 mg per tablet, Take 1 tablet by mouth daily, Disp: , Rfl:     mometasone (NASONEX) 50 mcg/act nasal spray, 1 spray into each nostril daily , Disp: , Rfl:     Multiple Vitamins-Minerals (CENTRUM SILVER PO), Take by mouth daily, Disp: , Rfl:     Calcium Polycarbophil (FIBER-CAPS PO), Take by mouth daily, Disp: , Rfl:    Allergies   Allergen Reactions    Lisinopril Cough        The following portions of the patient's history were updated by MA and reviewed by MD: allergies, current medications, past family history, past medical history, past social history, past surgical history and problem list       Physical Exam  Awake and alert  Her power is 5/5 in the lower extremities  Her station and gait are remarkably improved  She is standing more wrecked in her walking and she has very little difficulty in initiating ambulation  She cannot perform tandem gait without sidestepping for more than 3 steps  Her normal station and gait are intact  RESULTS/DATA  I have personally reviewed pertinent films in PACS   X-rays of the LS spine are carefully reviewed  These demonstrate the fractured screw which is unchanged in position in flexion extension  This is indicative of early fusion  The spondylolisthesis is unchanged

## 2021-12-27 ENCOUNTER — HOSPITAL ENCOUNTER (OUTPATIENT)
Dept: RADIOLOGY | Facility: HOSPITAL | Age: 61
Discharge: HOME | End: 2021-12-27
Attending: NEUROLOGICAL SURGERY
Payer: COMMERCIAL

## 2021-12-27 DIAGNOSIS — Z98.1 ENCOUNTER FOR POSTOPERATIVE CARE RELATED TO SURGICAL JOINT FUSION: ICD-10-CM

## 2021-12-27 DIAGNOSIS — Z48.89 ENCOUNTER FOR POSTOPERATIVE CARE RELATED TO SURGICAL JOINT FUSION: ICD-10-CM

## 2021-12-27 PROCEDURE — 72114 X-RAY EXAM L-S SPINE BENDING: CPT

## 2022-01-04 ENCOUNTER — TELEMEDICINE (OUTPATIENT)
Dept: NEUROSURGERY | Facility: CLINIC | Age: 62
End: 2022-01-04
Payer: COMMERCIAL

## 2022-01-04 VITALS — WEIGHT: 228 LBS | BODY MASS INDEX: 33.67 KG/M2

## 2022-01-04 DIAGNOSIS — Z98.1 ENCOUNTER FOR POSTOPERATIVE CARE RELATED TO SURGICAL JOINT FUSION: Primary | ICD-10-CM

## 2022-01-04 DIAGNOSIS — Z48.89 ENCOUNTER FOR POSTOPERATIVE CARE RELATED TO SURGICAL JOINT FUSION: Primary | ICD-10-CM

## 2022-01-04 PROCEDURE — 99212 OFFICE O/P EST SF 10 MIN: CPT | Performed by: NEUROLOGICAL SURGERY

## 2022-01-04 NOTE — PROGRESS NOTES
Virtual Regular Visit    Verification of patient location:    Patient is located in the following state in which I hold an active license [place]      Assessment/Plan: This is a 25-year-old female who is status post a lumbosacral fixation fusion  She is much improved  Her pain is minimal   Her pain in the right leg is well controlled and she is ambulating over a mile  I believe that her imaging studies demonstrates that she has gone on to fusion  There was 1 fractured pedicle screw which I think is irrelevant given her fusion at this point  We will see her back in the office on an as-needed basis  Problem List Items Addressed This Visit        Other    Encounter for postoperative care related to surgical joint fusion - Primary               Reason for visit is   Chief Complaint   Patient presents with    Virtual Regular Visit     6MO F/U AFTER XRAYS PER DKO        Encounter provider Pat Coburn MD    Provider located at [address] [place]  [address] [facility] [address] [phone]      Recent Visits  No visits were found meeting these conditions  Showing recent visits within past 7 days and meeting all other requirements  Today's Visits  Date Type Provider Dept   01/04/22 Telemedicine Pat Coburn, [address] [place] [address] today's visits and meeting all other requirements  Future Appointments  No visits were found meeting these conditions  Showing future appointments within next 150 days and meeting all other requirements       The patient was identified by name and date of birth  Tim Severin was informed that this is a telemedicine visit and that the visit is being conducted through [employer] and patient was informed that this is a secure, HIPAA-compliant platform  She agrees to proceed     My office door was closed  No one else was in the room    She acknowledged consent and understanding of privacy and security of the video platform  The patient has agreed to participate and understands they can discontinue the visit at any time  Patient is aware this is a billable service  Priya Puckett is a 62 y o  female who is seen for follow-up of a lumbosacral fusion      HPI overall doing well  Pain does not wake her from her sleep  Occasionally she has right leg pain but this is well controlled  She has no burning pain  Her leg is not given out on her  She has not fallen  She is overall happy with her surgical results  She continues to exercise and is walking over a mi      Past Medical History:   Diagnosis Date    Asthma     CPAP (continuous positive airway pressure) dependence     History of spinal cord injury     Hypertension     Low back pain     PONV (postoperative nausea and vomiting)     Sleep apnea        Past Surgical History:   Procedure Laterality Date    BOWEL RESECTION      CERVICAL DISC ARTHROPLASTY      CERVICAL FUSION      CHOLECYSTECTOMY      PARATHYROIDECTOMY      AR ARTHDSIS POST/POSTEROLATRL/POSTINTERBODY LUMBAR Left 10/28/2020    Procedure: L4 and S1 minimally invasive lumbar  fusion and L5 decompressive hemilaminectomy and bilateral foraminotomy;  Surgeon: Abbie Preston MD;  [facility];  Service: Neurosurgery    REPLACEMENT TOTAL KNEE Left 05/2021    SALPINGOOPHORECTOMY Left     SINUS SURGERY      TOTAL SHOULDER REPLACEMENT Right 10/2021    VASCULAR SURGERY      ablation        Current Outpatient Medications   Medication Sig Dispense Refill    azelastine (ASTELIN) 0 1 % nasal spray 1 spray into each nostril daily   11    Calcium Carbonate-Vitamin D (CALCIUM 500 + D PO) Take 2 tablets by mouth daily      Calcium Polycarbophil (FIBER-CAPS PO) Take by mouth daily      Cetirizine HCl (ZYRTEC PO) Take by mouth daily      clonazePAM (KlonoPIN) 0 125 mg disintegrating tablet Take by mouth daily at bedtime       colestipol (COLESTID) 1 g tablet Take 1 g by mouth daily at bedtime       DULoxetine (CYMBALTA) 30 mg delayed release capsule Take 30 mg by mouth 2 (two) times a day       famotidine (PEPCID) 40 MG tablet Take 40 mg by mouth every 12 (twelve) hours      ferrous sulfate 325 (65 Fe) mg tablet Take 325 mg by mouth 2 (two) times a day with meals      gabapentin (NEURONTIN) 600 MG tablet Take 600 mg by mouth daily      Glucosamine-Chondroit-Vit C-Mn (GLUCOSAMINE CHONDR 500 COMPLEX PO) Take 2 tablets by mouth daily      losartan-hydrochlorothiazide (HYZAAR) 50-12 5 mg per tablet Take 1 tablet by mouth daily      mometasone (NASONEX) 50 mcg/act nasal spray 1 spray into each nostril daily       Multiple Vitamins-Minerals (CENTRUM SILVER PO) Take by mouth daily      albuterol (PROVENTIL HFA,VENTOLIN HFA) 90 mcg/act inhaler Inhale 1 puff as needed  (Patient not taking: Reported on 1/4/2022 )       No current facility-administered medications for this visit  Allergies   Allergen Reactions    Lisinopril Cough       Review of Systems   Constitutional: Negative for activity change (PT 2x/week)  HENT: Negative  Eyes: Negative  Respiratory: Negative  Cardiovascular: Negative  Gastrointestinal: Negative  Endocrine: Negative  Genitourinary: Positive for frequency  Negative for urgency  Musculoskeletal: Positive for gait problem (occ off balance)  Negative for arthralgias (right shoulder sx 10/2021) and back pain (occasional discomfort with prolong sitting or restless night in bed)  No recent falls  PT for right shoulder     Skin: Negative  Allergic/Immunologic: Negative  Neurological: Positive for weakness (right leg; unchanged ) and numbness (and tingling in b/l hands)  Hematological: Negative  Psychiatric/Behavioral: Negative  I reviewed the ROS      Video Exam    Vitals:    01/04/22 0806   Weight: 103 kg (228 lb)       Physical Exam     Awake and alert  She is in no pain currently    X-rays of the LS spine demonstrate the instrumentation to be in good position without signs of loosening  There is breakage of 1 of the pedicle screws however there is no change in the position of the bones and flexion and extension indicative of early fusion  I spent 20 minutes directly with the patient during this visit    VIRTUAL VISIT Shelly Callaway verbally agrees to participate in [employer]  Pt is aware that [employer] could be limited without vital signs or the ability to perform a full hands-on physical exam  Keri Ferguson understands she or the provider may request at any time to terminate the video visit and request the patient to seek care or treatment in person

## 2023-03-30 NOTE — RESULT NOTES
Message   Recorded as Task   Date: 07/10/2017 09:39 AM, Created By: [facility]   Task Name: Follow Up   Assigned To: [facility] procedure,Team   Regarding Patient: Florin Gracia, Status: Active   CommentRubhavin Braggdemetrius - 10 Jul 2017 9:39 AM     TASK CREATED  S/P LESI #1 on 07/07/2017 w/SL [place]  LESI #2 scheduled on 07/24 @ 10:15am      Please call on 07/14/2017   [facility] - 14 Jul 2017 9:49 AM     TASK EDITED  patient states she is chanell and got 60% relief  Conf next injection scheduled on 07/24     Sukhi Avitia - 14 Jul 2017 9:58 AM     TASK REPLIED TO: Previously Assigned To Sukhi Avitia  aware        Signatures   Electronically signed by : Anita Whitten, ; Jul 14 2017 10:11AM EST                       (Author) Elidel Counseling: Patient may experience a mild burning sensation during topical application. Elidel is not approved in children less than 2 years of age. There have been case reports of hematologic and skin malignancies in patients using topical calcineurin inhibitors although causality is questionable.

## 2025-03-10 ENCOUNTER — HOSPITAL ENCOUNTER (OUTPATIENT)
Dept: HOSPITAL 99 - RCS | Age: 65
End: 2025-03-10
Payer: COMMERCIAL

## 2025-03-10 DIAGNOSIS — Z01.818: Primary | ICD-10-CM

## 2025-07-28 DIAGNOSIS — I10 PRIMARY HYPERTENSION: Primary | ICD-10-CM

## 2025-07-31 RX ORDER — LOSARTAN POTASSIUM 50 MG/1
50 TABLET ORAL DAILY
Qty: 90 TABLET | Refills: 3 | Status: SHIPPED | OUTPATIENT
Start: 2025-07-31

## 2025-08-08 PROBLEM — N32.81 OAB (OVERACTIVE BLADDER): Status: ACTIVE | Noted: 2025-08-08

## 2025-08-08 PROBLEM — M17.12 PRIMARY OSTEOARTHRITIS OF LEFT KNEE: Status: ACTIVE | Noted: 2025-08-08

## 2025-08-08 PROBLEM — K21.9 GERD (GASTROESOPHAGEAL REFLUX DISEASE): Status: ACTIVE | Noted: 2025-08-08

## 2025-08-08 PROBLEM — I87.2 VENOUS INSUFFICIENCY: Status: ACTIVE | Noted: 2025-08-08

## 2025-08-08 PROBLEM — J30.9 ALLERGIC RHINITIS: Status: ACTIVE | Noted: 2025-08-08

## 2025-08-08 PROBLEM — I83.812 VARICOSE VEINS OF LOWER EXTREMITY WITH PAIN, LEFT: Status: ACTIVE | Noted: 2025-08-08

## 2025-08-08 PROBLEM — N31.9 NEUROGENIC BLADDER: Status: ACTIVE | Noted: 2025-08-08

## 2025-08-08 PROBLEM — M48.061 NEUROFORAMINAL STENOSIS OF LUMBAR SPINE: Status: ACTIVE | Noted: 2025-08-08

## 2025-08-08 PROBLEM — G47.33 OSA ON CPAP: Status: ACTIVE | Noted: 2025-08-08

## 2025-08-08 PROBLEM — R33.9 URINARY RETENTION: Status: ACTIVE | Noted: 2025-08-08

## 2025-08-08 PROBLEM — G25.81 RESTLESS LEG SYNDROME: Status: ACTIVE | Noted: 2025-08-08

## 2025-08-08 PROBLEM — M50.20 HNP (HERNIATED NUCLEUS PULPOSUS), CERVICAL: Status: ACTIVE | Noted: 2025-08-08

## 2025-08-11 DIAGNOSIS — E78.2 MIXED HYPERLIPIDEMIA: Primary | ICD-10-CM

## 2025-08-20 ENCOUNTER — VBI (OUTPATIENT)
Dept: ADMINISTRATIVE | Facility: OTHER | Age: 65
End: 2025-08-20

## 2025-08-22 RX ORDER — COLESTIPOL HYDROCHLORIDE 1 G/1
1 TABLET ORAL
Qty: 90 TABLET | Refills: 1 | Status: SHIPPED | OUTPATIENT
Start: 2025-08-22

## (undated) DEVICE — GLOVE INDICATOR PI UNDERGLOVE SZ 8.5 BLUE

## (undated) DEVICE — INTENDED FOR TISSUE SEPARATION, AND OTHER PROCEDURES THAT REQUIRE A SHARP SURGICAL BLADE TO PUNCTURE OR CUT.: Brand: BARD-PARKER SAFETY BLADES SIZE 15, STERILE

## (undated) DEVICE — LIGHT HANDLE COVER SLEEVE DISP BLUE STELLAR

## (undated) DEVICE — INSTRUMENT 8670015 PAK 2 NEEDLES TROCAR

## (undated) DEVICE — PROXIMATE PLUS MD MULTI-DIRECTIONAL RELEASE SKIN STAPLERS CONTAINS 35 STAINLESS STEEL STAPLES APPROXIMATE CLOSED DIMENSIONS: 6.9MM X 3.9MM WIDE: Brand: PROXIMATE

## (undated) DEVICE — DRAPE C-ARMOUR

## (undated) DEVICE — TOOL 15BA50 LEGEND 15CM 5MM BA: Brand: MIDAS REX™

## (undated) DEVICE — BETHLEHEM UNIVERSAL SPINE, KIT: Brand: CARDINAL HEALTH

## (undated) DEVICE — SNAP KOVER: Brand: UNBRANDED

## (undated) DEVICE — TIBURON SPLIT SHEET: Brand: CONVERTORS

## (undated) DEVICE — DRAPE MICROSCOPE OPMI PENTERO

## (undated) DEVICE — PREP SURGICAL PURPREP 26ML

## (undated) DEVICE — 3M™ STERI-STRIP™ REINFORCED ADHESIVE SKIN CLOSURES, R1547, 1/2 IN X 4 IN (12 MM X 100 MM), 6 STRIPS/ENVELOPE: Brand: 3M™ STERI-STRIP™

## (undated) DEVICE — DRESSING MEPILEX AG BORDER 4 X 4 IN

## (undated) DEVICE — TRAY FOLEY 16FR URIMETER SURESTEP

## (undated) DEVICE — HEMOSTATIC MATRIX SURGIFLO 8ML W/THROMBIN

## (undated) DEVICE — SPONGE PVP SCRUB WING STERILE

## (undated) DEVICE — DRAPE SHEET X-LG

## (undated) DEVICE — SUPPLY FEE STD

## (undated) DEVICE — SUT VICRYL PLUS 3-0 RB-1 CR/8 18 IN VCP713D

## (undated) DEVICE — PENCIL ELECTROSURG E-Z CLEAN -0035H

## (undated) DEVICE — DISPOSABLE EQUIPMENT COVER: Brand: SMALL TOWEL DRAPE

## (undated) DEVICE — GUIDEWIRE 2345050 BLUNT THREADED 24IN

## (undated) DEVICE — MONITORING SPINAL IMPULSE CASE FEE

## (undated) DEVICE — GLOVE SRG BIOGEL 8

## (undated) DEVICE — TOOL 14MH30 LEGEND 14CM 3MM: Brand: MIDAS REX ™

## (undated) DEVICE — INTENDED FOR TISSUE SEPARATION, AND OTHER PROCEDURES THAT REQUIRE A SHARP SURGICAL BLADE TO PUNCTURE OR CUT.: Brand: BARD-PARKER ® CARBON RIB-BACK BLADES

## (undated) DEVICE — ELECTRODE BLADE MOD  E-Z CLEAN 6.5IN -0014M

## (undated) DEVICE — SURGIFOAM 8.5 X 12.5

## (undated) DEVICE — ANTIBACTERIAL VIOLET BRAIDED (POLYGLACTIN 910), SYNTHETIC ABSORBABLE SUTURE: Brand: COATED VICRYL

## (undated) DEVICE — MAGNETIC INSTRUMENT PAD 16" X 20"; LARGE; DISPOSABLE: Brand: CARDINAL HEALTH